# Patient Record
Sex: FEMALE | Race: BLACK OR AFRICAN AMERICAN | HISPANIC OR LATINO | Employment: UNEMPLOYED | URBAN - METROPOLITAN AREA
[De-identification: names, ages, dates, MRNs, and addresses within clinical notes are randomized per-mention and may not be internally consistent; named-entity substitution may affect disease eponyms.]

---

## 2022-07-26 ENCOUNTER — OFFICE VISIT (OUTPATIENT)
Dept: OBSTETRICS AND GYNECOLOGY | Facility: CLINIC | Age: 37
End: 2022-07-26

## 2022-07-26 VITALS
DIASTOLIC BLOOD PRESSURE: 86 MMHG | SYSTOLIC BLOOD PRESSURE: 115 MMHG | BODY MASS INDEX: 27.05 KG/M2 | HEART RATE: 89 BPM | HEIGHT: 62 IN | WEIGHT: 147 LBS

## 2022-07-26 DIAGNOSIS — E03.9 HYPOTHYROIDISM (ACQUIRED): ICD-10-CM

## 2022-07-26 DIAGNOSIS — R10.2 PELVIC PAIN: ICD-10-CM

## 2022-07-26 DIAGNOSIS — Z01.419 WELL WOMAN EXAM WITH ROUTINE GYNECOLOGICAL EXAM: Primary | ICD-10-CM

## 2022-07-26 PROCEDURE — 3008F BODY MASS INDEX DOCD: CPT | Performed by: OBSTETRICS & GYNECOLOGY

## 2022-07-26 PROCEDURE — 99385 PREV VISIT NEW AGE 18-39: CPT | Performed by: OBSTETRICS & GYNECOLOGY

## 2022-07-26 PROCEDURE — 2014F MENTAL STATUS ASSESS: CPT | Performed by: OBSTETRICS & GYNECOLOGY

## 2022-07-26 RX ORDER — NORGESTIMATE AND ETHINYL ESTRADIOL 0.25-0.035
1 KIT ORAL DAILY
Qty: 84 TABLET | Refills: 4 | Status: SHIPPED | OUTPATIENT
Start: 2022-07-26

## 2022-07-26 RX ORDER — LEVOTHYROXINE SODIUM 0.03 MG/1
100 TABLET ORAL DAILY
COMMUNITY
End: 2022-10-11 | Stop reason: SDUPTHER

## 2022-07-26 NOTE — PROGRESS NOTES
Chief Complaint   Patient presents with   • Gynecologic Exam     Np here for annual exam and to discuss birth control and thyroid        Jay Araujo is a 37 y.o.  who presents for an annual examination   Reports a pain in her pelvis.  Comes and goes. Feels most days. Worse on left  Moved here 3 months ago    Pap history:  Last pap: unsure - possibly 3 to 4 years ago  Prior abnormal paps: yes, had cone with laser in [place] at 27yo  STDs  Sexually active: yes  History of STDs: yes, chlamydia - many years ago  Has had HPV vaccine: unknown  Contraception:  OCP from [place]  Reports she has had an IUD before her delivery.      Screening for BRCA-   Is patient's family history significant for BRCA risk factors? no    Past Medical History:   Diagnosis Date   • Abnormal Pap smear of cervix    • Asthma    • Thyroid disease      Past Surgical History:   Procedure Laterality Date   • CERVICAL CONIZATION   W/ LASER      [place]   • COLPOSCOPY     • INDUCED      • SKIN BIOPSY       OB History    Para Term  AB Living   4 1 1   3 1   SAB IAB Ectopic Molar Multiple Live Births   2 1       1      # Outcome Date GA Lbr [place]/2nd Weight Sex Delivery Anes PTL Lv   4 IAB 17           3 SAB 17           2 SAB 10/26/15           1 Term 05   3685 g (8 lb 2 oz) M Vag-Spont   TIM      Social History     Tobacco Use   • Smoking status: Never Smoker   Substance Use Topics   • Alcohol use: Never   • Drug use: Never     Family History   Problem Relation Age of Onset   • Heart attack Father    • Heart disease Mother    • Breast cancer Neg Hx    • Ovarian cancer Neg Hx    • Uterine cancer Neg Hx    • Colon cancer Neg Hx      Current Outpatient Medications on File Prior to Visit   Medication Sig Dispense Refill   • levothyroxine (SYNTHROID, LEVOTHROID) 25 MCG tablet Take 25 mcg by mouth Daily.       No current facility-administered medications on file prior to visit.     Allergies   Allergen  "Reactions   • Iodinated Diagnostic Agents Swelling   • Penicillins Swelling        Review of Systems  See HPI    OBJECTIVE:   Vitals:    07/26/22 0926   BP: 115/86   Pulse: 89   Weight: 66.7 kg (147 lb)   Height: 157.5 cm (62\")      Physical Exam  Exam conducted with a chaperone present.   Constitutional:       General: She is not in acute distress.     Appearance: She is well-developed. She is not diaphoretic.   HENT:      Head: Normocephalic and atraumatic.   Neck:      Thyroid: No thyromegaly.      Trachea: No tracheal deviation.   Cardiovascular:      Rate and Rhythm: Normal rate.      Heart sounds: Normal heart sounds. No murmur heard.    No friction rub. No gallop.   Pulmonary:      Effort: Pulmonary effort is normal. No respiratory distress.      Breath sounds: Normal breath sounds.   Chest:      Chest wall: No tenderness.   Breasts:      Right: No inverted nipple, mass, nipple discharge, skin change or tenderness.      Left: No inverted nipple, mass, nipple discharge, skin change or tenderness.       Abdominal:      General: There is no distension.      Palpations: Abdomen is soft. There is no mass.      Tenderness: There is no abdominal tenderness.   Genitourinary:     General: Normal vulva.      Labia:         Right: No rash, lesion or injury.         Left: No rash, lesion or injury.       Vagina: No vaginal discharge, tenderness or bleeding.      Cervix: No cervical motion tenderness, discharge or friability.      Uterus: Not deviated, not enlarged, not fixed and not tender.       Adnexa:         Right: No mass, tenderness or fullness.          Left: No mass, tenderness or fullness.     Musculoskeletal:         General: No deformity. Normal range of motion.   Lymphadenopathy:      Cervical: No cervical adenopathy.   Skin:     General: Skin is warm and dry.      Findings: No rash.   Neurological:      Mental Status: She is alert and oriented to person, place, and time.   Psychiatric:         Behavior: " Behavior normal.         Thought Content: Thought content normal.         Judgment: Judgment normal.         ASSESSMENT/PLAN:   (Z01.419) Well woman exam with routine gynecological exam - Plan: IGP, Apt HPV,rfx 16 / 18,45, TSH, T4, Free, Chlamydia trachomatis, Neisseria gonorrhoeae, Trichomonas vaginalis, PCR - Swab, Cervix, HIV-1 / O / 2 Ag / Antibody 4th Generation, RPR, Hepatitis C Antibody, Hepatitis B Surface Antigen, US Non-ob Transvaginal    (R10.2) Pelvic pain - Plan: IGP, Apt HPV,rfx 16 / 18,45, TSH, T4, Free, Chlamydia trachomatis, Neisseria gonorrhoeae, Trichomonas vaginalis, PCR - Swab, Cervix, HIV-1 / O / 2 Ag / Antibody 4th Generation, RPR, Hepatitis C Antibody, Hepatitis B Surface Antigen, US Non-ob Transvaginal    (E03.9) Hypothyroidism (acquired) - Plan: TSH, T4, Free      Annual well woman exam:  Cervical cancer screening:    Reports h/o cervical dysplasia in past   The patient is due for a pap today.    Screening guidelines discussed with patient  Breast cancer screening:    Clinical breast exam recommended for age 20-39 years every 1-3 years   Mammogram recommended starting age 40    Breast self awareness encouraged  STD Screening   Testing desires.    Contraception :   Using oral contraceptive pill from [place]    Family history    does not demonstrate need for genetics referral   Healthy lifestyle counseling:   return for routine annual checkups   Pelvic: pain RTO for ultrasound   Hypothyroidism: unsure mcg of levothryoxine, will call with doses. Recommend TSH/free T4 today. Does not have PCP        Return GYN US, GYN follow up.

## 2022-07-27 LAB
HBV SURFACE AG SERPL QL IA: NEGATIVE
HCV AB S/CO SERPL IA: <0.1 S/CO RATIO (ref 0–0.9)
HIV 1+2 AB+HIV1 P24 AG SERPL QL IA: NON REACTIVE
RPR SER QL: NON REACTIVE
T4 FREE SERPL-MCNC: 1.54 NG/DL (ref 0.82–1.77)
TSH SERPL DL<=0.005 MIU/L-ACNC: 1.23 UIU/ML (ref 0.45–4.5)

## 2022-07-28 LAB
C TRACH RRNA SPEC QL NAA+PROBE: NEGATIVE
N GONORRHOEA RRNA SPEC QL NAA+PROBE: NEGATIVE
T VAGINALIS RRNA SPEC QL NAA+PROBE: NEGATIVE

## 2022-07-29 LAB
CYTOLOGIST CVX/VAG CYTO: NORMAL
CYTOLOGY CVX/VAG DOC CYTO: NORMAL
CYTOLOGY CVX/VAG DOC THIN PREP: NORMAL
DX ICD CODE: NORMAL
HIV 1 & 2 AB SER-IMP: NORMAL
HPV I/H RISK 4 DNA CVX QL PROBE+SIG AMP: NEGATIVE
Lab: NORMAL
OTHER STN SPEC: NORMAL
STAT OF ADQ CVX/VAG CYTO-IMP: NORMAL

## 2022-08-12 ENCOUNTER — OFFICE VISIT (OUTPATIENT)
Dept: OBSTETRICS AND GYNECOLOGY | Facility: CLINIC | Age: 37
End: 2022-08-12

## 2022-08-12 VITALS
SYSTOLIC BLOOD PRESSURE: 115 MMHG | BODY MASS INDEX: 27.53 KG/M2 | DIASTOLIC BLOOD PRESSURE: 77 MMHG | WEIGHT: 149.6 LBS | HEART RATE: 72 BPM | HEIGHT: 62 IN

## 2022-08-12 DIAGNOSIS — L98.9 SKIN LESION: ICD-10-CM

## 2022-08-12 DIAGNOSIS — R10.2 PELVIC PAIN: Primary | ICD-10-CM

## 2022-08-12 PROCEDURE — 99213 OFFICE O/P EST LOW 20 MIN: CPT | Performed by: OBSTETRICS & GYNECOLOGY

## 2022-08-12 RX ORDER — VALACYCLOVIR HYDROCHLORIDE 500 MG/1
TABLET, FILM COATED ORAL
Qty: 30 TABLET | Refills: 1 | Status: SHIPPED | OUTPATIENT
Start: 2022-08-12 | End: 2022-10-11 | Stop reason: SDUPTHER

## 2022-08-12 NOTE — PROGRESS NOTES
SUBJECTIVE:   Chief Complaint   Patient presents with   • Follow-up     Pt presents today to go over u/s results.         Jay Araujo is a 37 y.o.  who presents for ultrasound for pelvic pain which last visit she reported has been ongoing x 3 months prior to her last visit. This pain is better, but now she reports 3 years of skin lesions that come and go around the time of her period. They itch. Mostly on the labia and around the anus.  Denies any discharge/fluid from them.  No fever or chills. Has not used any topical or other treatment.     Past Medical History:   Diagnosis Date   • Abnormal Pap smear of cervix    • Asthma    • Thyroid disease      Past Surgical History:   Procedure Laterality Date   • CERVICAL CONIZATION   W/ LASER      [place]   • COLPOSCOPY     • INDUCED      • SKIN BIOPSY       OB History    Para Term  AB Living   4 1 1   3 1   SAB IAB Ectopic Molar Multiple Live Births   2 1       1      # Outcome Date GA Lbr [place]/2nd Weight Sex Delivery Anes PTL Lv   4 IAB 17           3 SAB 17           2 SAB 10/26/15           1 Term 05   3685 g (8 lb 2 oz) M Vag-Spont   TIM      Social History     Tobacco Use   • Smoking status: Never Smoker   Substance Use Topics   • Alcohol use: Never   • Drug use: Never     Family History   Problem Relation Age of Onset   • Heart attack Father    • Heart disease Mother    • Breast cancer Neg Hx    • Ovarian cancer Neg Hx    • Uterine cancer Neg Hx    • Colon cancer Neg Hx      Current Outpatient Medications on File Prior to Visit   Medication Sig Dispense Refill   • levothyroxine (SYNTHROID, LEVOTHROID) 25 MCG tablet Take 100 mcg by mouth Daily.     • norgestimate-ethinyl estradiol (Sprintec 28) 0.25-35 MG-MCG per tablet Take 1 tablet by mouth Daily. 84 tablet 4     No current facility-administered medications on file prior to visit.     Allergies   Allergen Reactions   • Iodinated Diagnostic Agents Swelling  "  • Penicillins Swelling        Review of Systems      OBJECTIVE:   Vitals:    08/12/22 0938   BP: 115/77   Pulse: 72   Weight: 67.9 kg (149 lb 9.6 oz)   Height: 157.5 cm (62.01\")      Physical Exam  Exam conducted with a chaperone present.   Constitutional:       Appearance: She is well-developed.   HENT:      Head: Normocephalic and atraumatic.   Eyes:      General: No scleral icterus.  Cardiovascular:      Rate and Rhythm: Normal rate.   Pulmonary:      Effort: Pulmonary effort is normal. No respiratory distress.   Abdominal:      General: There is no distension.      Palpations: Abdomen is soft.      Tenderness: There is no abdominal tenderness. There is no guarding.   Genitourinary:     Labia:         Right: No rash, tenderness or lesion.         Left: No rash, tenderness or lesion.       Vagina: No vaginal discharge, bleeding or lesions.      Cervix: No cervical motion tenderness or friability.      Uterus: Normal. Not enlarged and not tender.       Adnexa: Right adnexa normal.        Right: No mass or tenderness.          Left: No mass or tenderness.        Comments: Open vesicular appearing lesion at 2 o'clock near anus  Musculoskeletal:      Cervical back: Normal range of motion.   Skin:     General: Skin is warm and dry.   Neurological:      Mental Status: She is alert and oriented to person, place, and time.   Psychiatric:         Behavior: Behavior normal.         ASSESSMENT/PLAN:     ICD-10-CM ICD-9-CM   1. Pelvic pain  R10.2 [id number]   2. Skin lesion  L98.9 709.9     Ultrasound normal  Lesion with small skin opening. Swab sent  Counseled patient that we will treat with Valtrex while we await results. If negative, Will try steroid or antifungal cream    Orders Placed This Encounter   Procedures   • Herpes Simplex Virus (HSV) 1 & 2, [facility]ILMER - ThinPrep Vial, Cervix     Standing Status:   Future     Standing Expiration Date:   8/12/2023     Order Specific Question:   Release to patient     Answer:   Routine " Release       Return if symptoms worsen or fail to improve, for Annual physical.

## 2022-08-21 LAB
HSV1 DNA SPEC QL NAA+PROBE: NEGATIVE
HSV2 DNA SPEC QL NAA+PROBE: POSITIVE

## 2022-08-23 ENCOUNTER — TELEPHONE (OUTPATIENT)
Dept: OBSTETRICS AND GYNECOLOGY | Facility: CLINIC | Age: 37
End: 2022-08-23

## 2022-08-23 NOTE — TELEPHONE ENCOUNTER
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  The valtrex should help.  Herpes is an STD that is contagious. There is no cure for herpes but we do recommend treatment especially if the rash has not gone away. I will send medication to her pharmacy for treatment.  If she has questions, she may make an appointment to discuss further or I recommend looking at the CDC (Center for Disease Control) website on genital herpes.  The herpes virus is contagious even when you do not have a lesion or outbreak, so you should inform sexual partners of the diagnosis, and if they have any outbreaks they should get tested.  You can reduce transmission by not having sex (including oral sex) during an outbreak and using condoms every time you have sex. When women are pregnant, it is important that they inform their physicians that they have a history of herpes so that preventive measures can be taken around the time of delivery.      There was no answer; left general  for call back.

## 2022-09-02 ENCOUNTER — TELEPHONE (OUTPATIENT)
Dept: OBSTETRICS AND GYNECOLOGY | Facility: CLINIC | Age: 37
End: 2022-09-02

## 2022-09-02 NOTE — TELEPHONE ENCOUNTER
----- Message from Angela Shaw MD sent at 7/27/2022  8:02 AM EDT -----  Please let patient know that her thyroid testing was normal. Please confirm her synthroid dose- she was going to confirm at home.  I would recommend staying on that dose for now and establishing follow up with PCP.  Her HIV/HepC and syphilis testing was also normal (negative).

## 2022-10-06 ENCOUNTER — TELEPHONE (OUTPATIENT)
Dept: OBSTETRICS AND GYNECOLOGY | Facility: CLINIC | Age: 37
End: 2022-10-06

## 2022-10-11 ENCOUNTER — OFFICE VISIT (OUTPATIENT)
Dept: OBSTETRICS AND GYNECOLOGY | Facility: CLINIC | Age: 37
End: 2022-10-11

## 2022-10-11 VITALS
WEIGHT: 157 LBS | BODY MASS INDEX: 28.89 KG/M2 | HEART RATE: 90 BPM | HEIGHT: 62 IN | SYSTOLIC BLOOD PRESSURE: 122 MMHG | DIASTOLIC BLOOD PRESSURE: 73 MMHG

## 2022-10-11 DIAGNOSIS — B00.9 HSV INFECTION: Primary | ICD-10-CM

## 2022-10-11 DIAGNOSIS — R10.2 PELVIC PAIN: ICD-10-CM

## 2022-10-11 PROCEDURE — 99213 OFFICE O/P EST LOW 20 MIN: CPT | Performed by: OBSTETRICS & GYNECOLOGY

## 2022-10-11 RX ORDER — LEVOTHYROXINE SODIUM 0.03 MG/1
100 TABLET ORAL DAILY
Qty: 90 TABLET | Refills: 0 | Status: SHIPPED | OUTPATIENT
Start: 2022-10-11

## 2022-10-11 RX ORDER — VITAMIN C, CALCIUM, IRON, VITAMIN D3, VITAMIN E, VITAMIN B1, VITAMIN B2, VITAMIN B3, VITAMIN B6, FOLIC ACID, IODINE, ZINC, COPPER, DOCUSATE SODIUM, DOCOSAHEXAENOIC ACID (DHA) 27-1-50 MG
1 KIT ORAL DAILY
Qty: 90 EACH | Refills: 4 | Status: SHIPPED | OUTPATIENT
Start: 2022-10-11

## 2022-10-11 RX ORDER — VALACYCLOVIR HYDROCHLORIDE 500 MG/1
TABLET, FILM COATED ORAL
Qty: 30 TABLET | Refills: 1 | Status: SHIPPED | OUTPATIENT
Start: 2022-10-11

## 2022-10-11 NOTE — PROGRESS NOTES
SUBJECTIVE:   Chief Complaint   Patient presents with   • Follow-up     Pt presents today for f/u         Jay Obrienterry Araujo is a 37 y.o.  who presents for discussion of recent lesions on vulva and possible conception. She would like to come off oral contraceptive pill and try to conceive. She has the Valtrex. Reports her partner has had similar lesions in the past but not currently. She denies any issues at this time aside from some pelvic pain because she is on her period    Past Medical History:   Diagnosis Date   • Abnormal Pap smear of cervix    • Asthma    • Thyroid disease      Past Surgical History:   Procedure Laterality Date   • CERVICAL CONIZATION   W/ LASER      [place]   • COLPOSCOPY     • INDUCED      • SKIN BIOPSY       OB History    Para Term  AB Living   4 1 1   3 1   SAB IAB Ectopic Molar Multiple Live Births   2 1       1      # Outcome Date GA Lbr [place]/2nd Weight Sex Delivery Anes PTL Lv   4 IAB 17           3 SAB 17           2 SAB 10/26/15           1 Term 05   3685 g (8 lb 2 oz) M Vag-Spont   TIM      Social History     Tobacco Use   • Smoking status: Never   Substance Use Topics   • Alcohol use: Never   • Drug use: Never     Family History   Problem Relation Age of Onset   • Heart attack Father    • Heart disease Mother    • Breast cancer Neg Hx    • Ovarian cancer Neg Hx    • Uterine cancer Neg Hx    • Colon cancer Neg Hx      Current Outpatient Medications on File Prior to Visit   Medication Sig Dispense Refill   • norgestimate-ethinyl estradiol (Sprintec 28) 0.25-35 MG-MCG per tablet Take 1 tablet by mouth Daily. 84 tablet 4   • [DISCONTINUED] levothyroxine (SYNTHROID, LEVOTHROID) 25 MCG tablet Take 100 mcg by mouth Daily.     • [DISCONTINUED] valACYclovir (Valtrex) 500 MG tablet BID x 3 days PRN outbreak 30 tablet 1     No current facility-administered medications on file prior to visit.     Allergies   Allergen Reactions   •  "Iodinated Diagnostic Agents Swelling   • Penicillins Swelling        Review of Systems      OBJECTIVE:   Vitals:    10/11/22 1038   BP: 122/73   Pulse: 90   Weight: 71.2 kg (157 lb)   Height: 157.5 cm (62.01\")      Physical Exam  Constitutional:       General: She is not in acute distress.     Appearance: She is well-developed. She is not diaphoretic.   HENT:      Head: Normocephalic and atraumatic.   Eyes:      General: No scleral icterus.     Extraocular Movements: Extraocular movements intact.   Pulmonary:      Effort: Pulmonary effort is normal. No respiratory distress.   Skin:     General: Skin is warm and dry.   Neurological:      General: No focal deficit present.      Mental Status: She is alert and oriented to person, place, and time.   Psychiatric:         Mood and Affect: Mood normal.         Behavior: Behavior normal.         Thought Content: Thought content normal.         Judgment: Judgment normal.         ASSESSMENT/PLAN:     ICD-10-CM ICD-9-CM   1. HSV infection  B00.9 054.9   2. Pelvic pain  R10.2 [id number]     We reviewed that Herpes is an STD that is contagious. There is no cure for herpes but we do recommend treatment especially if the rash has not gone away. She was counseled that the herpes virus is contagious even when you do not have a lesion or outbreak, so you should inform sexual partners of the diagnosis, and if they have any outbreaks they should get tested.  We discussed that she can reduce transmission by not having sex (including oral sex) during an outbreak and using condoms every time you have sex. When women are pregnant, it is important that they inform their physicians that they have a history of herpes so that preventive measures can be taken around the time of delivery.   Patient was counseled on the expected course of fertility with each cycle.  She was counseled on avoidance of smoking, drugs, alcohol while trying to conceive.  We discussed potential causes of infertility, and " she was counseled that possible limitations for her may include calling when pregnant to adjust thyroid medication.   Discussed lab options including STI labs, vaccine titers,.    PNV recommended     No orders of the defined types were placed in this encounter.      No follow-ups on file.

## 2023-04-26 RX ORDER — LEVOTHYROXINE SODIUM 0.03 MG/1
100 TABLET ORAL DAILY
Qty: 90 TABLET | Refills: 0 | Status: SHIPPED | OUTPATIENT
Start: 2023-04-26

## 2023-04-26 NOTE — TELEPHONE ENCOUNTER
Med refill. Colin pt. AE 7/26/2022, last seen 10/11/22 pelvic pain & HSV infection. [facility] pharmacy on file. Thank you.

## 2023-05-16 RX ORDER — LEVOTHYROXINE SODIUM 0.03 MG/1
100 TABLET ORAL DAILY
Qty: 90 TABLET | Refills: 0 | OUTPATIENT
Start: 2023-05-16

## 2023-10-09 RX ORDER — NORGESTIMATE AND ETHINYL ESTRADIOL 0.25-0.035
1 KIT ORAL DAILY
Qty: 84 TABLET | Refills: 4 | OUTPATIENT
Start: 2023-10-09

## 2023-10-12 RX ORDER — NORGESTIMATE AND ETHINYL ESTRADIOL 0.25-0.035
1 KIT ORAL DAILY
Qty: 84 TABLET | Refills: 0 | Status: SHIPPED | OUTPATIENT
Start: 2023-10-12

## 2024-01-10 ENCOUNTER — OFFICE VISIT (OUTPATIENT)
Dept: FAMILY MEDICINE CLINIC | Facility: CLINIC | Age: 39
End: 2024-01-10
Payer: COMMERCIAL

## 2024-01-10 VITALS
HEIGHT: 62 IN | OXYGEN SATURATION: 96 % | WEIGHT: 165.4 LBS | HEART RATE: 106 BPM | DIASTOLIC BLOOD PRESSURE: 88 MMHG | RESPIRATION RATE: 16 BRPM | BODY MASS INDEX: 30.44 KG/M2 | TEMPERATURE: 96.9 F | SYSTOLIC BLOOD PRESSURE: 142 MMHG

## 2024-01-10 DIAGNOSIS — Z30.9 ENCOUNTER FOR CONTRACEPTIVE MANAGEMENT, UNSPECIFIED TYPE: ICD-10-CM

## 2024-01-10 DIAGNOSIS — Z71.1 CONCERN ABOUT CURRENT PREGNANCY WITHOUT DIAGNOSIS: ICD-10-CM

## 2024-01-10 DIAGNOSIS — Z83.3 FAMILY HISTORY OF DIABETES MELLITUS: ICD-10-CM

## 2024-01-10 DIAGNOSIS — E05.90 HYPERTHYROIDISM: Primary | ICD-10-CM

## 2024-01-10 DIAGNOSIS — Z13.220 LIPID SCREENING: ICD-10-CM

## 2024-01-10 LAB
B-HCG UR QL: NEGATIVE
EXPIRATION DATE: NORMAL
INTERNAL NEGATIVE CONTROL: NORMAL
INTERNAL POSITIVE CONTROL: NORMAL
Lab: NORMAL

## 2024-01-10 PROCEDURE — 81025 URINE PREGNANCY TEST: CPT | Performed by: NURSE PRACTITIONER

## 2024-01-10 PROCEDURE — 99204 OFFICE O/P NEW MOD 45 MIN: CPT | Performed by: NURSE PRACTITIONER

## 2024-01-10 RX ORDER — NORGESTIMATE AND ETHINYL ESTRADIOL 0.25-0.035
1 KIT ORAL DAILY
Qty: 28 TABLET | Refills: 12 | Status: SHIPPED | OUTPATIENT
Start: 2024-01-10

## 2024-01-10 RX ORDER — LEVOTHYROXINE SODIUM 0.1 MG/1
100 TABLET ORAL DAILY
Qty: 90 TABLET | Refills: 1 | Status: SHIPPED | OUTPATIENT
Start: 2024-01-10

## 2024-01-10 NOTE — PROGRESS NOTES
"Chief Complaint  Establish Care, Hypothyroidism, Dizziness (C/o dizziness x2 months ), Anxiety (Hx panic attacks ), Depression, Headache (Co headaches  frequent), and Asthma    THIS VISIT WAS CONDUCTED WITH THE AIDE OF THE  SERVICE TO ENSURE UNDERSTANDING BETWEEN JAY AND THIS PROVIDER.    Subjective        Jay Araujo presents to [facility]  History of Present Illness      Jay has been off her thyroid medication for 2 months and is losing hair, is fatigued, and nauseous.  She wanted to discuss birth control.  She is in her late 30's, has one child and does not desire to become pregnant at this time.  Discussed IUD with her through the  service.  She decided to stay on her current birth control at this time.    Objective   Vital Signs:  /88   Pulse 106   Temp 96.9 °F (36.1 °C) (Temporal)   Resp 16   Ht 157.5 cm (62.01\")   Wt 75 kg (165 lb 6.4 oz)   SpO2 96%   BMI 30.24 kg/m²   Estimated body mass index is 30.24 kg/m² as calculated from the following:    Height as of this encounter: 157.5 cm (62.01\").    Weight as of this encounter: 75 kg (165 lb 6.4 oz).       BMI is >= 30 and <35. (Class 1 Obesity). The following options were offered after discussion;: weight loss educational material (shared in after visit summary), exercise counseling/recommendations, and nutrition counseling/recommendations      Physical Exam  Constitutional:       Appearance: She is normal weight.   HENT:      Head: Normocephalic and atraumatic.      Right Ear: Tympanic membrane, ear canal and external ear normal.      Left Ear: Tympanic membrane, ear canal and external ear normal.      Mouth/Throat:      Mouth: Mucous membranes are moist.   Eyes:      Pupils: Pupils are equal, round, and reactive to light.   Cardiovascular:      Rate and Rhythm: Normal rate and regular rhythm.      Pulses: Normal pulses.      Heart sounds: Normal heart sounds. "   Pulmonary:      Effort: Pulmonary effort is normal.   Abdominal:      General: Abdomen is flat.   Musculoskeletal:         General: Normal range of motion.   Skin:     General: Skin is warm and dry.      Capillary Refill: Capillary refill takes less than 2 seconds.   Neurological:      General: No focal deficit present.      Mental Status: She is alert.   Psychiatric:         Mood and Affect: Mood normal.               Assessment and Plan   Diagnoses and all orders for this visit:    1. Hyperthyroidism (Primary)  -     CBC & Differential  -     Comprehensive Metabolic Panel  -     Thyroid Panel With TSH  -     Vitamin B12  -     Vitamin D,25-Hydroxy  -     Cancel: Urinalysis With Microscopic - Urine, Clean Catch  -     levothyroxine (Synthroid) 100 MCG tablet; Take 1 tablet by mouth Daily.  Dispense: 90 tablet; Refill: 1    2. Concern about current pregnancy without diagnosis  -     POC Pregnancy, Urine    3. Lipid screening  -     Lipid Panel    4. Family history of diabetes mellitus  -     Hemoglobin A1c    5. Encounter for contraceptive management, unspecified type  -     norgestimate-ethinyl estradiol (ORTHO-CYCLEN) 0.25-35 MG-MCG per tablet; Take 1 tablet by mouth Daily.  Dispense: 28 tablet; Refill: 12    Discussed Care Gaps, ordered referrals and encouraged vaccination updates.       - Pt agrees with plan of care and denies further questions/concerns today  - This document is intended for medical expert use only. Persons  reading this document without medical staff guidance may result in misinterpretation and unintended morbidity     Go to the ER for any possible life-threatening symptoms such as chest pain or shortness of air.      Please allow 3-5 business days for recommendations based on new results      I personally spent time with this patient, preparing for the visit, reviewing tests, obtaining and/or reviewing a separately obtained history, performing a medically appropriate examination and/or  evaluation, counseling and educating the patient/family/caregiver, ordering medications,  documenting information in the medical record and indepentently interpreting results.              Follow Up   Return in about 6 months (around 7/10/2024).  Patient was given instructions and counseling regarding her condition or for health maintenance advice. Please see specific information pulled into the AVS if appropriate.

## 2024-01-12 LAB
GLUCOSE UR QL STRIP: NORMAL
KETONES UR QL STRIP: NORMAL
PH UR STRIP: NORMAL [PH]
PROT UR QL STRIP: NORMAL
SP GR UR STRIP: NORMAL
SPECIMEN STATUS: NORMAL

## 2024-01-13 LAB
25(OH)D3+25(OH)D2 SERPL-MCNC: 19.8 NG/ML (ref 30–100)
ALBUMIN SERPL-MCNC: 4.3 G/DL
ALBUMIN/GLOB SERPL: 1.7 {RATIO} (ref 1.2–2.2)
ALP SERPL-CCNC: 102 IU/L (ref 44–121)
ALT SERPL-CCNC: 20 IU/L
AST SERPL-CCNC: 19 IU/L (ref 0–40)
BASOPHILS # BLD AUTO: 0 X10E3/UL
BASOPHILS NFR BLD AUTO: 1 %
BILIRUB SERPL-MCNC: <0.2 MG/DL (ref 0–1.2)
BUN SERPL-MCNC: 7 MG/DL
BUN/CREAT SERPL: 10
CALCIUM SERPL-MCNC: 9.3 MG/DL
CHLORIDE SERPL-SCNC: 105 MMOL/L (ref 96–106)
CHOLEST SERPL-MCNC: 181 MG/DL
CO2 SERPL-SCNC: 20 MMOL/L (ref 20–29)
CREAT SERPL-MCNC: 0.69 MG/DL
EGFRCR SERPLBLD CKD-EPI 2021: ABNORMAL ML/MIN/1.73
EOSINOPHIL # BLD AUTO: 0 X10E3/UL
EOSINOPHIL NFR BLD AUTO: 1 %
ERYTHROCYTE [DISTWIDTH] IN BLOOD BY AUTOMATED COUNT: 12.4 %
FT4I SERPL CALC-MCNC: 1.8 (ref 1.2–4.9)
GLOBULIN SER CALC-MCNC: 2.6 G/DL (ref 1.5–4.5)
GLUCOSE SERPL-MCNC: 108 MG/DL (ref 70–99)
HBA1C MFR BLD: 5.5 % (ref 4.8–5.6)
HCT VFR BLD AUTO: 41.4 %
HDLC SERPL-MCNC: 45 MG/DL
HGB BLD-MCNC: 13.3 G/DL
IMM GRANULOCYTES # BLD AUTO: 0 X10E3/UL
IMM GRANULOCYTES NFR BLD AUTO: 0 %
LDLC SERPL CALC-MCNC: 113 MG/DL
LYMPHOCYTES # BLD AUTO: 2.4 X10E3/UL
LYMPHOCYTES NFR BLD AUTO: 48 %
MCH RBC QN AUTO: 27.9 PG
MCHC RBC AUTO-ENTMCNC: 32.1 G/DL
MCV RBC AUTO: 87 FL
MONOCYTES # BLD AUTO: 0.4 X10E3/UL
MONOCYTES NFR BLD AUTO: 8 %
NEUTROPHILS # BLD AUTO: 2.1 X10E3/UL
NEUTROPHILS NFR BLD AUTO: 42 %
PLATELET # BLD AUTO: 271 X10E3/UL (ref 150–450)
POTASSIUM SERPL-SCNC: 4.3 MMOL/L (ref 3.5–5.2)
PROT SERPL-MCNC: 6.9 G/DL (ref 6–8.5)
RBC # BLD AUTO: 4.77 X10E6/UL
SODIUM SERPL-SCNC: 140 MMOL/L (ref 134–144)
T3RU NFR SERPL: 22 %
T4 SERPL-MCNC: 8.3 UG/DL (ref 4.5–12)
TRIGL SERPL-MCNC: 127 MG/DL
TSH SERPL DL<=0.005 MIU/L-ACNC: 1.43 UIU/ML (ref 0.45–4.5)
VIT B12 SERPL-MCNC: 225 PG/ML (ref 232–1245)
VLDLC SERPL CALC-MCNC: 23 MG/DL (ref 5–40)
WBC # BLD AUTO: 5 X10E3/UL (ref 3.4–10.8)
WRITTEN AUTHORIZATION: NORMAL

## 2024-10-11 ENCOUNTER — OFFICE VISIT (OUTPATIENT)
Dept: FAMILY MEDICINE CLINIC | Facility: CLINIC | Age: 39
End: 2024-10-11
Payer: COMMERCIAL

## 2024-10-11 VITALS
WEIGHT: 173.4 LBS | BODY MASS INDEX: 31.91 KG/M2 | HEART RATE: 88 BPM | HEIGHT: 62 IN | OXYGEN SATURATION: 100 % | SYSTOLIC BLOOD PRESSURE: 144 MMHG | DIASTOLIC BLOOD PRESSURE: 80 MMHG

## 2024-10-11 DIAGNOSIS — E03.9 ACQUIRED HYPOTHYROIDISM: Primary | ICD-10-CM

## 2024-10-11 DIAGNOSIS — E78.2 MIXED HYPERLIPIDEMIA: ICD-10-CM

## 2024-10-11 DIAGNOSIS — Z3A.01 LESS THAN 8 WEEKS GESTATION OF PREGNANCY: ICD-10-CM

## 2024-10-11 PROCEDURE — 1126F AMNT PAIN NOTED NONE PRSNT: CPT | Performed by: NURSE PRACTITIONER

## 2024-10-11 PROCEDURE — 99213 OFFICE O/P EST LOW 20 MIN: CPT | Performed by: NURSE PRACTITIONER

## 2024-10-11 NOTE — PROGRESS NOTES
Subjective   Jay Araujo is a 39 y.o. female. Presents today for No chief complaint on file.      History Of Present Illness Jay presents for an annual physical and to have her thyroid checked today    Hypothyroidism: Levothyroxine    Birth control: Ortho-Cyclen    Herpes simplex 2: Valacyclovir    Care gaps:  Annual physical    Influenza  COVID      There is no problem list on file for this patient.      Social History     Socioeconomic History    Marital status:    Tobacco Use    Smoking status: Never   Vaping Use    Vaping status: Never Used   Substance and Sexual Activity    Alcohol use: Never    Drug use: Never    Sexual activity: Yes     Partners: Male     Birth control/protection: Birth control pill       Allergies   Allergen Reactions    Iodinated Contrast Media Swelling    Penicillins Swelling       Current Outpatient Medications on File Prior to Visit   Medication Sig Dispense Refill    levothyroxine (Synthroid) 100 MCG tablet Take 1 tablet by mouth Daily. 90 tablet 1    norgestimate-ethinyl estradiol (ORTHO-CYCLEN) 0.25-35 MG-MCG per tablet Take 1 tablet by mouth Daily. 28 tablet 12    Prenat w/o A-FeCbGl-DSS-FA-DHA (PNV OB+DHA) 27-1 & 250 MG misc Take 1 tablet by mouth Daily. 90 each 4    valACYclovir (Valtrex) 500 MG tablet BID x 3 days PRN outbreak 30 tablet 1     No current facility-administered medications on file prior to visit.       Objective   There were no vitals filed for this visit.  There is no height or weight on file to calculate BMI.    Physical Exam    Procedures     Assessment & Plan   There are no diagnoses linked to this encounter.                  No follow-ups on file.

## 2024-11-12 ENCOUNTER — TELEPHONE (OUTPATIENT)
Dept: OBSTETRICS AND GYNECOLOGY | Facility: CLINIC | Age: 39
End: 2024-11-12

## 2024-11-12 ENCOUNTER — INITIAL PRENATAL (OUTPATIENT)
Dept: OBSTETRICS AND GYNECOLOGY | Facility: CLINIC | Age: 39
End: 2024-11-12
Payer: COMMERCIAL

## 2024-11-12 VITALS — BODY MASS INDEX: 32.18 KG/M2 | SYSTOLIC BLOOD PRESSURE: 110 MMHG | DIASTOLIC BLOOD PRESSURE: 78 MMHG | WEIGHT: 176 LBS

## 2024-11-12 DIAGNOSIS — O98.519 HERPES SIMPLEX TYPE 2 (HSV-2) INFECTION AFFECTING PREGNANCY, ANTEPARTUM: ICD-10-CM

## 2024-11-12 DIAGNOSIS — B00.9 HERPES SIMPLEX TYPE 2 (HSV-2) INFECTION AFFECTING PREGNANCY, ANTEPARTUM: ICD-10-CM

## 2024-11-12 DIAGNOSIS — M79.602 LEFT ARM PAIN: ICD-10-CM

## 2024-11-12 DIAGNOSIS — O34.40 PREGNANCY COMPLICATED BY PRIOR CERVICAL CONIZATION, ANTEPARTUM: ICD-10-CM

## 2024-11-12 DIAGNOSIS — Z34.90 PREGNANCY, UNSPECIFIED GESTATIONAL AGE: Primary | ICD-10-CM

## 2024-11-12 DIAGNOSIS — O09.521 MULTIGRAVIDA OF ADVANCED MATERNAL AGE IN FIRST TRIMESTER: ICD-10-CM

## 2024-11-12 DIAGNOSIS — Z86.39 HISTORY OF HYPOTHYROIDISM: ICD-10-CM

## 2024-11-12 LAB
B-HCG UR QL: POSITIVE
EXPIRATION DATE: ABNORMAL
GLUCOSE UR STRIP-MCNC: NEGATIVE MG/DL
INTERNAL NEGATIVE CONTROL: ABNORMAL
INTERNAL POSITIVE CONTROL: ABNORMAL
Lab: ABNORMAL
PROT UR STRIP-MCNC: NEGATIVE MG/DL

## 2024-11-12 RX ORDER — PNV NO.95/FERROUS FUM/FOLIC AC 28MG-0.8MG
1 TABLET ORAL DAILY
Qty: 90 TABLET | Refills: 4 | Status: SHIPPED | OUTPATIENT
Start: 2024-11-12

## 2024-11-12 NOTE — PROGRESS NOTES
Initial OB Visit    Chief Complaint   Patient presents with    Initial Prenatal Visit        Jay Araujo is being seen today for her first obstetrical visit.  She is a 39 y.o.    11w4d gestation by LMP  FOB: here with her today  This is a planned pregnancy.   OB History    Para Term  AB Living   4 1 1   2 1   SAB IAB Ectopic Molar Multiple Live Births   1 1       1      # Outcome Date GA Lbr [place]/2nd Weight Sex Type Anes PTL Lv   4 Current            3 IAB 17           2 SAB 10/26/15           1 Term 05   3685 g (8 lb 2 oz) M Vag-Spont   TIM       Current obstetric complaints: left arm and hand cramping. 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 It is worse at work. Reports some palpitations and dizziness at times, No h/o cardiac issues in herself but her mom has ?cardiomyopathy from high blood pressure, her dad is    Prior obstetric issues, potential pregnancy concerns: one  18 years ago. Reports one pregnancy loss approximately  Family history of genetic issues (includes FOB): denies  Prior infections concerning in pregnancy (Rash, fever since LMP): denies  Prior genetic testing: denies  History of abnormal pap smears: h/o conization in [place] >15 years ago  History of STIs: see below  History of HSV in self or partner? H/o HSV in .       Past Medical History:   Diagnosis Date    Abnormal Pap smear of cervix     Asthma     Thyroid disease        Past Surgical History:   Procedure Laterality Date    CERVICAL CONIZATION   W/ LASER      [place]    COLPOSCOPY      INDUCED       SKIN BIOPSY           Current Outpatient Medications:     levothyroxine (Synthroid) 100 MCG tablet, Take 1 tablet by mouth Daily. (Patient not taking: Reported on 2024), Disp: 90 tablet, Rfl: 1    norgestimate-ethinyl estradiol (ORTHO-CYCLEN) 0.25-35 MG-MCG per tablet, Take 1 tablet by mouth Daily. (Patient not taking: Reported on 2024), Disp:  28 tablet, Rfl: 12    Prenat w/o A-FeCbGl-DSS-FA-DHA (PNV OB+DHA) 27-1 & 250 MG misc, Take 1 tablet by mouth Daily. (Patient not taking: Reported on 11/12/2024), Disp: 90 each, Rfl: 4    Prenatal Vit-Fe Fumarate-FA (Prenatal Vitamin) 27-0.8 MG tablet, Take 1 tablet by mouth Daily., Disp: 90 tablet, Rfl: 4    valACYclovir (Valtrex) 500 MG tablet, BID x 3 days PRN outbreak (Patient not taking: Reported on 11/12/2024), Disp: 30 tablet, Rfl: 1    Allergies   Allergen Reactions    Iodinated Contrast Media Swelling    Penicillins Swelling       Social History     Socioeconomic History    Marital status:    Tobacco Use    Smoking status: Never   Vaping Use    Vaping status: Never Used   Substance and Sexual Activity    Alcohol use: Never    Drug use: Never    Sexual activity: Yes     Partners: Male     Birth control/protection: Birth control pill       Family History   Problem Relation Age of Onset    Heart attack Father     Heart disease Mother     Breast cancer Neg Hx     Ovarian cancer Neg Hx     Uterine cancer Neg Hx     Colon cancer Neg Hx        Review of systems   See HPI         Objective    /78   Wt 79.8 kg (176 lb)   LMP 08/23/2024 (Exact Date)   BMI 32.18 kg/m²       General Appearance:    Alert, cooperative, in no acute distress, habitus normal   Head:    Normocephalic, without obvious abnormality, atraumatic   Eyes:            Lids and lashes normal, conjunctivae and sclerae normal, no   icterus, no pallor, corneas clear   Ears:    Ears appear intact with no abnormalities noted       Neck:   No adenopathy, supple, trachea midline, no thyromegaly   Back:     No kyphosis present, no scoliosis present,                       Lungs:     Clear to auscultation,respirations regular, even and                   unlabored    Heart:    Regular rhythm and normal rate, normal S1 and S2, no            murmur, no gallop, no rub, no click   Breast Exam:    No masses, No nipple discharge   Abdomen:     Normal  bowel sounds, no masses, no organomegaly, soft        non-tender, non-distended, no guarding, no rebound                 tenderness   Genitalia:    Vulva - BUS-WNL, NEFG    Vagina - No discharge, No bleeding    Cervix - No Lesions, closed     Uterus - Consistent with 10 weeks    Adnexa - No masses, NT    Pelvimetry - clinically adequate, gynecoid pelvis     Extremities:   Moves all extremities well, no edema, no cyanosis, no              redness   Pulses:   Pulses palpable and equal bilaterally   Skin:   No bleeding, bruising or rash   Lymph nodes:   No palpable adenopathy   Neurologic:   Sensation intact, A&O times 3      Assessment  (Z34.90) Pregnancy, unspecified gestational age - Plan: POC Urinalysis Dipstick, POC Pregnancy, Urine, Urine Culture - Urine, Urine, Clean Catch, Drug Profile Urine - 9 Drugs - Urine, Clean Catch, Chlamydia trachomatis, Neisseria gonorrhoeae, Trichomonas vaginalis, PCR - Swab, Vagina, Varicella Zoster Antibody, IgG, OB Panel With HIV and Treponema Palldium Ab, TSH Rfx On Abnormal To Free T4, [id number] PLUS Core+SCA - Blood,, Inheritest Core Panel (CF97,SMA,FraX) - Blood,, Comprehensive Metabolic Panel, Hemoglobin A1c, Holter Monitor - 24 Hour, Ambulatory Referral to Physical Therapy for Evaluation & Treatment    (O98.519,  B00.9) Herpes simplex type 2 (HSV-2) infection affecting pregnancy, antepartum    (O34.40) Pregnancy complicated by prior cervical conization, antepartum    (M79.602) Left arm pain - Plan: Comprehensive Metabolic Panel, Hemoglobin A1c, Holter Monitor - 24 Hour, Ambulatory Referral to Physical Therapy for Evaluation & Treatment    (Z86.39) History of hypothyroidism - Plan: TSH Rfx On Abnormal To Free T4, Hemoglobin A1c    (O09.521) Multigravida of advanced maternal age in first trimester - Plan: [id number] PLUS Core+SCA - Blood,, Inheritest Core Panel (CF97,SMA,FraX) - Blood,, Hemoglobin A1c    Plan    Initial labs drawn, GC/CHL screen done  Patient is on Prenatal  vitamins  Problem list reviewed and updated.  Reviewed routine prenatal care with the office to include but not limited to:   Questions regarding specific pregnancy activities.  Reviewed nature of practice and hospital.  Reviewed recommended follow up, importance of compliance with care. We reviewed testing in pregnancy including HIV testing and urine drug screen.    Reviewed aneuploidy screening and carrier genetic screening.  We reviewed limitations of testing, possibility of false positive/negative results, possible need for other tests as indicated.    Desires cell free DNA and carrier screening  Counseled on limitations of ultrasound in pregnancy in detecting aneuploidy/fetal anomalies  - will check holter monitor and CMP but it seems MSK related. Sent referral to PT due to limitations in job  - check TSH for h/o hypothyroidism, previously on 100mcg Synthroid  - HSV, reviewed suppressoin  - H/o cone- cervical length    All questions answered.   Return for US today (11:20), 4 weeks for OB visit. Labs after US today.      Angela Shaw MD

## 2024-11-12 NOTE — TELEPHONE ENCOUNTER
Please let patient know that her due date is 6/10/25 with a gestational age of 10w based on today's Ultrasound. Thanks!

## 2024-11-13 LAB
ALBUMIN SERPL-MCNC: 4.3 G/DL (ref 3.9–4.9)
ALP SERPL-CCNC: 124 IU/L (ref 44–121)
ALT SERPL-CCNC: 18 IU/L (ref 0–32)
AMPHETAMINES UR QL SCN: NEGATIVE NG/ML
AST SERPL-CCNC: 18 IU/L (ref 0–40)
BARBITURATES UR QL SCN: NEGATIVE NG/ML
BENZODIAZ UR QL: NEGATIVE NG/ML
BILIRUB SERPL-MCNC: 0.3 MG/DL (ref 0–1.2)
BUN SERPL-MCNC: 6 MG/DL (ref 6–20)
BUN/CREAT SERPL: 8 (ref 9–23)
BZE UR QL: NEGATIVE NG/ML
CALCIUM SERPL-MCNC: 9.8 MG/DL (ref 8.7–10.2)
CANNABINOIDS UR QL SCN: NEGATIVE NG/ML
CHLORIDE SERPL-SCNC: 103 MMOL/L (ref 96–106)
CO2 SERPL-SCNC: 22 MMOL/L (ref 20–29)
CREAT SERPL-MCNC: 0.73 MG/DL (ref 0.57–1)
EGFRCR SERPLBLD CKD-EPI 2021: 108 ML/MIN/1.73
GLOBULIN SER CALC-MCNC: 2.8 G/DL (ref 1.5–4.5)
GLUCOSE SERPL-MCNC: 76 MG/DL (ref 70–99)
HBA1C MFR BLD: 5.5 % (ref 4.8–5.6)
METHADONE UR QL SCN: NEGATIVE NG/ML
OPIATES UR QL: NEGATIVE NG/ML
PCP UR QL SCN: NEGATIVE NG/ML
POTASSIUM SERPL-SCNC: 4.8 MMOL/L (ref 3.5–5.2)
PROPOXYPH UR QL SCN: NEGATIVE NG/ML
PROT SERPL-MCNC: 7.1 G/DL (ref 6–8.5)
SODIUM SERPL-SCNC: 138 MMOL/L (ref 134–144)
TSH SERPL DL<=0.005 MIU/L-ACNC: 1.42 UIU/ML (ref 0.45–4.5)
VZV IGG SER QL IA: NON REACTIVE

## 2024-11-14 LAB
ABO GROUP BLD: NORMAL
BASOPHILS # BLD AUTO: 0 X10E3/UL (ref 0–0.2)
BASOPHILS NFR BLD AUTO: 1 %
BLD GP AB SCN SERPL QL: NEGATIVE
C TRACH RRNA SPEC QL NAA+PROBE: NEGATIVE
EOSINOPHIL # BLD AUTO: 0.1 X10E3/UL (ref 0–0.4)
EOSINOPHIL NFR BLD AUTO: 1 %
ERYTHROCYTE [DISTWIDTH] IN BLOOD BY AUTOMATED COUNT: 12.2 % (ref 11.7–15.4)
HBV SURFACE AG SERPL QL IA: NEGATIVE
HCT VFR BLD AUTO: 40.9 % (ref 34–46.6)
HCV AB SERPL QL IA: NORMAL
HCV IGG SERPL QL IA: NON REACTIVE
HGB BLD-MCNC: 13.5 G/DL (ref 11.1–15.9)
HIV 1+2 AB+HIV1 P24 AG SERPL QL IA: NON REACTIVE
IMM GRANULOCYTES # BLD AUTO: 0 X10E3/UL (ref 0–0.1)
IMM GRANULOCYTES NFR BLD AUTO: 1 %
LYMPHOCYTES # BLD AUTO: 1.9 X10E3/UL (ref 0.7–3.1)
LYMPHOCYTES NFR BLD AUTO: 38 %
MCH RBC QN AUTO: 29 PG (ref 26.6–33)
MCHC RBC AUTO-ENTMCNC: 33 G/DL (ref 31.5–35.7)
MCV RBC AUTO: 88 FL (ref 79–97)
MONOCYTES # BLD AUTO: 0.4 X10E3/UL (ref 0.1–0.9)
MONOCYTES NFR BLD AUTO: 8 %
N GONORRHOEA RRNA SPEC QL NAA+PROBE: NEGATIVE
NEUTROPHILS # BLD AUTO: 2.5 X10E3/UL (ref 1.4–7)
NEUTROPHILS NFR BLD AUTO: 51 %
PLATELET # BLD AUTO: 264 X10E3/UL (ref 150–450)
RBC # BLD AUTO: 4.66 X10E6/UL (ref 3.77–5.28)
RH BLD: POSITIVE
RUBV IGG SERPL IA-ACNC: 2.13 INDEX
T VAGINALIS RRNA SPEC QL NAA+PROBE: NEGATIVE
TREPONEMA PALLIDUM IGG+IGM AB [PRESENCE] IN SERUM OR PLASMA BY IMMUNOASSAY: NON REACTIVE
WBC # BLD AUTO: 4.9 X10E3/UL (ref 3.4–10.8)

## 2024-11-15 LAB
BACTERIA UR CULT: NORMAL
BACTERIA UR CULT: NORMAL

## 2024-11-17 LAB
CFDNA.FET/CFDNA.TOTAL SFR FETUS: NORMAL %
CITATION REF LAB TEST: NORMAL
FET 13+18+21+X+Y ANEUP PLAS.CFDNA: NEGATIVE
FET CHR 21 TS PLAS.CFDNA QL: NEGATIVE
FET MS X RISK WBC.DNA+CFDNA QL: NOT DETECTED
FET SEX PLAS.CFDNA DOSAGE CFDNA: NORMAL
FET TS 13 RISK PLAS.CFDNA QL: NEGATIVE
FET TS 18 RISK WBC.DNA+CFDNA QL: NEGATIVE
FET X + Y ANEUP RISK PLAS.CFDNA SEQ-IMP: NOT DETECTED
GA EST FROM CONCEPTION DATE: NORMAL D
GESTATIONAL AGE > 9:: YES
LAB DIRECTOR NAME PROVIDER: NORMAL
LAB DIRECTOR NAME PROVIDER: NORMAL
LABORATORY COMMENT REPORT: NORMAL
LIMITATIONS OF THE TEST: NORMAL
NEGATIVE PREDICTIVE VALUE: NORMAL
NOTE: NORMAL
PERFORMANCE CHARACTERISTICS: NORMAL
POSITIVE PREDICTIVE VALUE: NORMAL
REF LAB TEST METHOD: NORMAL
TEST PERFORMANCE INFO SPEC: NORMAL

## 2024-11-20 LAB
CITATION REF LAB TEST: NORMAL
GENDER IDENTITY: NORMAL
GENE DIS ANL CARRIER INTERP-IMP: NORMAL
GENE STUDIED ID: NORMAL
GENETIC SCN SPEC: NORMAL
LAB DIRECTOR NAME PROVIDER: NORMAL
Lab: NORMAL
REASON FOR REFERRAL (NARRATIVE): NORMAL
RECOMMENDATION PATIENT DOC-IMP: NORMAL
REF LAB TEST METHOD: NORMAL
SERVICE CMNT-IMP: NORMAL
SPECIMEN SOURCE: NORMAL

## 2024-12-10 ENCOUNTER — ROUTINE PRENATAL (OUTPATIENT)
Dept: OBSTETRICS AND GYNECOLOGY | Facility: CLINIC | Age: 39
End: 2024-12-10
Payer: COMMERCIAL

## 2024-12-10 VITALS — BODY MASS INDEX: 31.12 KG/M2 | DIASTOLIC BLOOD PRESSURE: 76 MMHG | WEIGHT: 170.2 LBS | SYSTOLIC BLOOD PRESSURE: 108 MMHG

## 2024-12-10 DIAGNOSIS — R51.9 NONINTRACTABLE EPISODIC HEADACHE, UNSPECIFIED HEADACHE TYPE: ICD-10-CM

## 2024-12-10 DIAGNOSIS — B00.9 HERPES SIMPLEX TYPE 2 (HSV-2) INFECTION AFFECTING PREGNANCY, ANTEPARTUM: ICD-10-CM

## 2024-12-10 DIAGNOSIS — Z3A.14 14 WEEKS GESTATION OF PREGNANCY: Primary | ICD-10-CM

## 2024-12-10 DIAGNOSIS — O98.519 HERPES SIMPLEX TYPE 2 (HSV-2) INFECTION AFFECTING PREGNANCY, ANTEPARTUM: ICD-10-CM

## 2024-12-10 DIAGNOSIS — K59.00 CONSTIPATION, UNSPECIFIED CONSTIPATION TYPE: ICD-10-CM

## 2024-12-10 DIAGNOSIS — O09.521 MULTIGRAVIDA OF ADVANCED MATERNAL AGE IN FIRST TRIMESTER: ICD-10-CM

## 2024-12-10 LAB
GLUCOSE UR STRIP-MCNC: NEGATIVE MG/DL
PROT UR STRIP-MCNC: NEGATIVE MG/DL

## 2024-12-10 RX ORDER — VALACYCLOVIR HYDROCHLORIDE 500 MG/1
TABLET, FILM COATED ORAL
Qty: 30 TABLET | Refills: 1 | Status: SHIPPED | OUTPATIENT
Start: 2024-12-10

## 2024-12-10 RX ORDER — VITAMIN C, CALCIUM, IRON, VITAMIN D3, VITAMIN E, THIAMIN, RIBOFLAVIN, NIACINAMIDE, VITAMIN B6, FOLIC ACID, IODINE, ZINC, COPPER, DOCUSATE SODIUM
1 KIT DAILY
Qty: 60 TABLET | Refills: 6 | Status: SHIPPED | OUTPATIENT
Start: 2024-12-10

## 2024-12-10 RX ORDER — DOCUSATE SODIUM 100 MG/1
100 CAPSULE, LIQUID FILLED ORAL 2 TIMES DAILY
Qty: 60 CAPSULE | Refills: 1 | Status: SHIPPED | OUTPATIENT
Start: 2024-12-10

## 2024-12-10 NOTE — PROGRESS NOTES
Chief Complaint   Patient presents with    Routine Prenatal Visit     14 weeks  Pt states unable to  prenatal at the pharmacy       Jay Araujo is a 39 y.o.  at 14w0d  here for routine OB visit  Reports she has had a HA 1-2 days per week. Better with rest. Usually in AM  Reports constipation, not taking anything for this  Has not yet been contacted by PT for likely carpal tunnel  Reports decreased appetite, beth for meat.  Eating beans for protein    /76   Wt 77.2 kg (170 lb 3.2 oz)   LMP 2024 (Exact Date)   BMI 31.12 kg/m²        Gen: NAD, well appearing  Abd: nontender    See OB Flowsheet    ASSESSMENT/PLAN:  Diagnoses and all orders for this visit:    1. 14 weeks gestation of pregnancy (Primary)  -     POC Urinalysis Dipstick    2. Herpes simplex type 2 (HSV-2) infection affecting pregnancy, antepartum    3. Multigravida of advanced maternal age in first trimester    4. Nonintractable episodic headache, unspecified headache type    5. Constipation, unspecified constipation type    Other orders  -     Prenat w/o A-FeCbGl-DSS-FA-DHA (CitraNatal DHA) 27-1 & 250 MG tablet; Take 1 tablet by mouth Daily.  Dispense: 60 tablet; Refill: 6  -     valACYclovir (Valtrex) 500 MG tablet; BID x 3 days PRN outbreak  Dispense: 30 tablet; Refill: 1  -     docusate sodium (Colace) 100 MG capsule; Take 1 capsule by mouth 2 (Two) Times a Day.  Dispense: 60 capsule; Refill: 1    Reviewed HA. Recommend trial of tylenol and if not improved, call us  Reviewed constipation, Rx sent for colace  Refill sent on PNV  Reviewed valtrex sent in case of HSV outbreak  Aware of labs  Reviewed balanced diet, likely will improve with second trimester  Reviewed common second trimester symptoms.  Reviewed precautions for signs of  labor, anticipated fetal movements.       Return in about 5 weeks (around 2025) for OB visit with anatomy US.

## 2025-01-12 NOTE — PROGRESS NOTES
THIS VISIT WAS CONDUCTED WITH THE AIDE OF THE  SERVICE TO ENSURE UNDERSTANDING BETWEEN JAY AND THIS PROVIDER.     Chana did not record visit.  Likely  error.      Subjective   Jay Araujo is a 39 y.o. female. Presents today for   Chief Complaint   Patient presents with    Hypothyroidism       History Of Present Illness Jay Araujo is a 39 year old female presenting today for 3 month follow up on pregnancy.  She reports her blood pressure has been running 199/126 in the early afternoons.  She wore a holter monitor for 72 hours and the results were unremarkable.    History of Present Illness      Patient Active Problem List   Diagnosis    Herpes simplex type 2 (HSV-2) infection affecting pregnancy, antepartum    Pregnancy complicated by prior cervical conization, antepartum       Social History     Socioeconomic History    Marital status:    Tobacco Use    Smoking status: Never   Vaping Use    Vaping status: Never Used   Substance and Sexual Activity    Alcohol use: Never    Drug use: Never    Sexual activity: Yes     Partners: Male     Birth control/protection: Birth control pill       Allergies   Allergen Reactions    Iodinated Contrast Media Swelling    Penicillins Swelling       Current Outpatient Medications on File Prior to Visit   Medication Sig Dispense Refill    cephalexin (KEFLEX) 500 MG capsule Take 1 capsule by mouth Every 12 (Twelve) Hours.      docusate sodium (Colace) 100 MG capsule Take 1 capsule by mouth 2 (Two) Times a Day. 60 capsule 1    polyethylene glycol (MIRALAX) 17 GM/SCOOP powder DISSOLVE 17 GRAMS IN WATER OR JUICE AND TAKE BY MOUTH EVERY DAY. START WITH 1 CAPFUL DAILY AND GO UP OR DOWN UNTIL HAVING 1 SOFT BOWEL MOVEMENT (Patient not taking: Reported on 1/13/2025)      [DISCONTINUED] Prenat w/o A-FeCbGl-DSS-FA-DHA (CitraNatal DHA) 27-1 & 250 MG tablet Take 1 tablet by mouth Daily. (Patient not taking: Reported on 1/13/2025) 60  "tablet 6    [DISCONTINUED] Prenatal Vit-Fe Fumarate-FA (Prenatal Vitamin) 27-0.8 MG tablet Take 1 tablet by mouth Daily. (Patient not taking: Reported on 1/13/2025) 90 tablet 4    [DISCONTINUED] valACYclovir (Valtrex) 500 MG tablet BID x 3 days PRN outbreak (Patient not taking: Reported on 1/13/2025) 30 tablet 1     No current facility-administered medications on file prior to visit.       Objective   Vitals:    01/13/25 1113   BP: 120/76   Pulse: 90   SpO2: 97%   Weight: 78.7 kg (173 lb 6.4 oz)   Height: 157.5 cm (62.01\")     Body mass index is 31.71 kg/m².    Physical Exam    Physical Exam  Constitutional:       Appearance: Normal appearance.   HENT:      Head: Normocephalic and atraumatic.      Mouth/Throat:      Mouth: Mucous membranes are moist.   Eyes:      Pupils: Pupils are equal, round, and reactive to light.   Cardiovascular:      Rate and Rhythm: Normal rate and regular rhythm.      Pulses: Normal pulses.      Heart sounds: Normal heart sounds.   Pulmonary:      Effort: Pulmonary effort is normal.      Breath sounds: Normal breath sounds.   Abdominal:      General: Bowel sounds are normal.      Comments: SHE HAS A MODERATE PREGNANCY BELLY   Musculoskeletal:         General: Normal range of motion.   Skin:     General: Skin is warm and dry.      Capillary Refill: Capillary refill takes less than 2 seconds.   Neurological:      General: No focal deficit present.      Mental Status: She is alert.   Psychiatric:         Mood and Affect: Mood normal.       Results         Procedures     Assessment & Plan   Diagnoses and all orders for this visit:    1. Pregnancy, unspecified gestational age (Primary)  -     Prenat w/o A-FeCbGl-DSS-FA-DHA (CitraNatal DHA) 27-1 & 250 MG tablet; Take 1 tablet by mouth Daily.  Dispense: 60 tablet; Refill: 6  -     pyridoxine (VITAMIN B-6) 250 MG tablet tablet; Take 1 tablet by mouth Daily.  Dispense: 30 tablet; Refill: 6  -     CBC & Differential  -     Comprehensive Metabolic " Panel  -     Hemoglobin A1c    2. Primary hypertension  -     NIFEdipine (PROCARDIA) 20 MG capsule; Take 1 capsule by mouth Daily. Take at noon to prevent spikes in blood pressure  Dispense: 90 capsule; Refill: 3    3. Lipid screening  -     Lipid Panel    Other orders  -     nystatin (MYCOSTATIN) 394664 UNIT/GM powder; Apply  topically to the appropriate area as directed 2 (Two) Times a Day.  Dispense: 30 g; Refill: 3         Assessment & Plan    Body mass index is 31.71 kg/m².    Discussed Care Gaps, ordered referrals and encouraged vaccination updates.       - Pt agrees with plan of care and denies further questions/concerns today  - This document is intended for medical expert use only. Persons  reading this document without medical staff guidance may result in misinterpretation and unintended morbidity     Go to the ER for any possible life-threatening symptoms such as chest pain or shortness of air.      Please allow 3-5 business days for recommendations based on new results      I personally spent time with this patient, preparing for the visit, reviewing tests, obtaining and/or reviewing a separately obtained history, performing a medically appropriate examination and/or evaluation, counseling and educating the patient/family/caregiver, ordering medications,  documenting information in the medical record and indepentently interpreting results.                    Return in about 2 weeks (around 1/27/2025) for Next scheduled follow up BLOOD PRESSSURE FOLLOW UP.            MDM:  Placed patient on nifedipine daily for hypertension in the afternoons with instructions to take the medication around noon, as her spikes are around 2:00 pm.  She is to follow up with me in 2 weeks to see if the medication is effective.

## 2025-01-13 ENCOUNTER — OFFICE VISIT (OUTPATIENT)
Dept: FAMILY MEDICINE CLINIC | Facility: CLINIC | Age: 40
End: 2025-01-13
Payer: COMMERCIAL

## 2025-01-13 VITALS
HEIGHT: 62 IN | HEART RATE: 90 BPM | DIASTOLIC BLOOD PRESSURE: 76 MMHG | WEIGHT: 173.4 LBS | OXYGEN SATURATION: 97 % | BODY MASS INDEX: 31.91 KG/M2 | SYSTOLIC BLOOD PRESSURE: 120 MMHG

## 2025-01-13 DIAGNOSIS — Z34.90 PREGNANCY, UNSPECIFIED GESTATIONAL AGE: Primary | ICD-10-CM

## 2025-01-13 DIAGNOSIS — Z13.220 LIPID SCREENING: ICD-10-CM

## 2025-01-13 DIAGNOSIS — I10 PRIMARY HYPERTENSION: ICD-10-CM

## 2025-01-13 PROCEDURE — 1126F AMNT PAIN NOTED NONE PRSNT: CPT | Performed by: NURSE PRACTITIONER

## 2025-01-13 PROCEDURE — 99214 OFFICE O/P EST MOD 30 MIN: CPT | Performed by: NURSE PRACTITIONER

## 2025-01-13 RX ORDER — NYSTATIN 100000 [USP'U]/G
POWDER TOPICAL 2 TIMES DAILY
Qty: 30 G | Refills: 3 | Status: SHIPPED | OUTPATIENT
Start: 2025-01-13

## 2025-01-13 RX ORDER — VITAMIN C, CALCIUM, IRON, VITAMIN D3, VITAMIN E, THIAMIN, RIBOFLAVIN, NIACINAMIDE, VITAMIN B6, FOLIC ACID, IODINE, ZINC, COPPER, DOCUSATE SODIUM
1 KIT DAILY
Qty: 60 TABLET | Refills: 6 | Status: SHIPPED | OUTPATIENT
Start: 2025-01-13

## 2025-01-13 RX ORDER — POLYETHYLENE GLYCOL 3350 17 G/17G
POWDER, FOR SOLUTION ORAL
COMMUNITY
Start: 2024-12-16

## 2025-01-13 RX ORDER — NIFEDIPINE 20 MG/1
20 CAPSULE ORAL DAILY
Qty: 90 CAPSULE | Refills: 3 | Status: SHIPPED | OUTPATIENT
Start: 2025-01-13

## 2025-01-13 RX ORDER — CEPHALEXIN 500 MG/1
1 CAPSULE ORAL EVERY 12 HOURS SCHEDULED
COMMUNITY
Start: 2024-12-16

## 2025-01-13 RX ORDER — PYRIDOXINE HCL (VITAMIN B6) 250 MG
250 TABLET ORAL DAILY
Qty: 30 TABLET | Refills: 6 | Status: SHIPPED | OUTPATIENT
Start: 2025-01-13

## 2025-01-14 ENCOUNTER — ROUTINE PRENATAL (OUTPATIENT)
Dept: OBSTETRICS AND GYNECOLOGY | Facility: CLINIC | Age: 40
End: 2025-01-14
Payer: COMMERCIAL

## 2025-01-14 VITALS — DIASTOLIC BLOOD PRESSURE: 67 MMHG | SYSTOLIC BLOOD PRESSURE: 107 MMHG | WEIGHT: 171 LBS | BODY MASS INDEX: 31.27 KG/M2

## 2025-01-14 DIAGNOSIS — Z3A.19 19 WEEKS GESTATION OF PREGNANCY: Primary | ICD-10-CM

## 2025-01-14 DIAGNOSIS — O09.522 MULTIGRAVIDA OF ADVANCED MATERNAL AGE IN SECOND TRIMESTER: ICD-10-CM

## 2025-01-14 DIAGNOSIS — O98.519 HERPES SIMPLEX TYPE 2 (HSV-2) INFECTION AFFECTING PREGNANCY, ANTEPARTUM: ICD-10-CM

## 2025-01-14 DIAGNOSIS — B00.9 HERPES SIMPLEX TYPE 2 (HSV-2) INFECTION AFFECTING PREGNANCY, ANTEPARTUM: ICD-10-CM

## 2025-01-14 DIAGNOSIS — O16.9 ELEVATED BLOOD PRESSURE AFFECTING PREGNANCY, ANTEPARTUM: ICD-10-CM

## 2025-01-14 LAB
ALBUMIN SERPL-MCNC: 3.6 G/DL (ref 3.9–4.9)
ALP SERPL-CCNC: 140 IU/L (ref 44–121)
ALT SERPL-CCNC: 12 IU/L (ref 0–32)
AST SERPL-CCNC: 11 IU/L (ref 0–40)
BASOPHILS # BLD AUTO: 0 X10E3/UL (ref 0–0.2)
BASOPHILS NFR BLD AUTO: 1 %
BILIRUB SERPL-MCNC: <0.2 MG/DL (ref 0–1.2)
BUN SERPL-MCNC: 4 MG/DL (ref 6–20)
BUN/CREAT SERPL: 8 (ref 9–23)
CALCIUM SERPL-MCNC: 9.1 MG/DL (ref 8.7–10.2)
CHLORIDE SERPL-SCNC: 106 MMOL/L (ref 96–106)
CHOLEST SERPL-MCNC: 211 MG/DL (ref 100–199)
CO2 SERPL-SCNC: 19 MMOL/L (ref 20–29)
CREAT SERPL-MCNC: 0.52 MG/DL (ref 0.57–1)
EGFRCR SERPLBLD CKD-EPI 2021: 122 ML/MIN/1.73
EOSINOPHIL # BLD AUTO: 0.1 X10E3/UL (ref 0–0.4)
EOSINOPHIL NFR BLD AUTO: 1 %
ERYTHROCYTE [DISTWIDTH] IN BLOOD BY AUTOMATED COUNT: 12.3 % (ref 11.7–15.4)
GLOBULIN SER CALC-MCNC: 2.2 G/DL (ref 1.5–4.5)
GLUCOSE SERPL-MCNC: 74 MG/DL (ref 70–99)
GLUCOSE UR STRIP-MCNC: NEGATIVE MG/DL
HBA1C MFR BLD: 5.3 % (ref 4.8–5.6)
HCT VFR BLD AUTO: 35.8 % (ref 34–46.6)
HDLC SERPL-MCNC: 51 MG/DL
HGB BLD-MCNC: 11.8 G/DL (ref 11.1–15.9)
IMM GRANULOCYTES # BLD AUTO: 0 X10E3/UL (ref 0–0.1)
IMM GRANULOCYTES NFR BLD AUTO: 1 %
LDLC SERPL CALC-MCNC: 136 MG/DL (ref 0–99)
LYMPHOCYTES # BLD AUTO: 1.6 X10E3/UL (ref 0.7–3.1)
LYMPHOCYTES NFR BLD AUTO: 31 %
MCH RBC QN AUTO: 29 PG (ref 26.6–33)
MCHC RBC AUTO-ENTMCNC: 33 G/DL (ref 31.5–35.7)
MCV RBC AUTO: 88 FL (ref 79–97)
MONOCYTES # BLD AUTO: 0.5 X10E3/UL (ref 0.1–0.9)
MONOCYTES NFR BLD AUTO: 9 %
NEUTROPHILS # BLD AUTO: 3.1 X10E3/UL (ref 1.4–7)
NEUTROPHILS NFR BLD AUTO: 57 %
PLATELET # BLD AUTO: 201 X10E3/UL (ref 150–450)
POTASSIUM SERPL-SCNC: 3.9 MMOL/L (ref 3.5–5.2)
PROT SERPL-MCNC: 5.8 G/DL (ref 6–8.5)
PROT UR STRIP-MCNC: NEGATIVE MG/DL
RBC # BLD AUTO: 4.07 X10E6/UL (ref 3.77–5.28)
SODIUM SERPL-SCNC: 138 MMOL/L (ref 134–144)
TRIGL SERPL-MCNC: 133 MG/DL (ref 0–149)
VLDLC SERPL CALC-MCNC: 24 MG/DL (ref 5–40)
WBC # BLD AUTO: 5.3 X10E3/UL (ref 3.4–10.8)

## 2025-01-14 NOTE — PROGRESS NOTES
Your BUN and Creatinine are low showing that your muscle mass is low.  Please add some strength training to your daily routine.  Your Protein and albumin levels are low likely due to your pregnancy.  We will monitor them for now.  Your alkaline phosphatase level is elevated.  This could also be from your pregnancy.  Your Total cholesterol is elevated at 211 and your LDL is elevated at 136.  For now, watch your diet and exercise as this will assist in normalizing these levels.

## 2025-01-14 NOTE — PROGRESS NOTES
Chief Complaint   Patient presents with    Routine Prenatal Visit     19 weeks      Jay Araujo is a 39 y.o.  at 19w0d  here for routine OB visit  Reports that she has not been able to  RX from pharmacy.  Reports she went to ED ([facility]) and was sent an antibiotic but did not pick it up due to her penicillin allergy. Reports swelling and hives from penicillin as a child. Reports getting a cephalosporin last pregnancy and doing the same thing when she took it. No pain with urination  Went to see Family Medicine yesterday and was given Procardia immediate release for afternoon episodes of hypertension. She has had normal blood pressures during her office visits. She has not started taking this.    She was also given B6 and sent a refill of PNV    /67   Wt 77.6 kg (171 lb)   LMP 2024 (Exact Date)   BMI 31.27 kg/m²        Gen: NAD, well appearing  Abd: nontender    See OB Flowsheet    ASSESSMENT/PLAN:  Diagnoses and all orders for this visit:    1. 19 weeks gestation of pregnancy (Primary)  -     POC Urinalysis Dipstick    2. Multigravida of advanced maternal age in second trimester    3. Herpes simplex type 2 (HSV-2) infection affecting pregnancy, antepartum    Reviewed anatomy US and limitations. We will repeat next visit for suboptimal face/cardiac views  She was able to  valtrex but reports no more outbreaks  She will go to pharmacy and  PNV.    We reviewed her afternoon BP elevations.  I reviewed that nifedipine is safe in pregnancy but if she is not having true HTN it could cause low BP and dizziness. I asked her to make an appt for BP check in afternoon or to call if she feels it is going up.  She will come back for this.    Reviewed antibiotic allergies.  Her UA at [facility] showed protein, culture was negative.  No need to take antibiotics  Reviewed common second trimester symptoms.  Reviewed precautions for signs of  labor, anticipated fetal  movements.     Considering tubal ligation. Counseled and will consider if desires to sign papers  Return in about 4 weeks (around 2/11/2025) for anatomy US (repeat) and OB visit; please make nurse appointment in the afternoon for a BP check.

## 2025-01-16 ENCOUNTER — CLINICAL SUPPORT (OUTPATIENT)
Dept: OBSTETRICS AND GYNECOLOGY | Facility: CLINIC | Age: 40
End: 2025-01-16
Payer: COMMERCIAL

## 2025-01-16 VITALS — SYSTOLIC BLOOD PRESSURE: 112 MMHG | DIASTOLIC BLOOD PRESSURE: 74 MMHG

## 2025-01-28 DIAGNOSIS — R11.0 NAUSEA: Primary | ICD-10-CM

## 2025-01-28 RX ORDER — LANOLIN ALCOHOL/MO/W.PET/CERES
50 CREAM (GRAM) TOPICAL DAILY
Qty: 180 TABLET | Refills: 1 | Status: SHIPPED | OUTPATIENT
Start: 2025-01-28

## 2025-02-11 ENCOUNTER — ROUTINE PRENATAL (OUTPATIENT)
Dept: OBSTETRICS AND GYNECOLOGY | Facility: CLINIC | Age: 40
End: 2025-02-11
Payer: COMMERCIAL

## 2025-02-11 VITALS — DIASTOLIC BLOOD PRESSURE: 79 MMHG | BODY MASS INDEX: 31.89 KG/M2 | SYSTOLIC BLOOD PRESSURE: 117 MMHG | WEIGHT: 174.4 LBS

## 2025-02-11 DIAGNOSIS — Z13.89 SCREENING FOR BLOOD OR PROTEIN IN URINE: Primary | ICD-10-CM

## 2025-02-11 DIAGNOSIS — Z34.90 PREGNANCY, UNSPECIFIED GESTATIONAL AGE: ICD-10-CM

## 2025-02-11 DIAGNOSIS — R11.0 NAUSEA: ICD-10-CM

## 2025-02-11 DIAGNOSIS — B00.9 HERPES SIMPLEX TYPE 2 (HSV-2) INFECTION AFFECTING PREGNANCY, ANTEPARTUM: ICD-10-CM

## 2025-02-11 DIAGNOSIS — M54.32 LEFT SCIATIC NERVE PAIN: ICD-10-CM

## 2025-02-11 DIAGNOSIS — O98.519 HERPES SIMPLEX TYPE 2 (HSV-2) INFECTION AFFECTING PREGNANCY, ANTEPARTUM: ICD-10-CM

## 2025-02-11 DIAGNOSIS — Z86.39 HISTORY OF HYPOTHYROIDISM: ICD-10-CM

## 2025-02-11 LAB
GLUCOSE UR STRIP-MCNC: NEGATIVE MG/DL
PROT UR STRIP-MCNC: ABNORMAL MG/DL

## 2025-02-11 RX ORDER — VITAMIN C, CALCIUM, IRON, VITAMIN D3, VITAMIN E, THIAMIN, RIBOFLAVIN, NIACINAMIDE, VITAMIN B6, FOLIC ACID, IODINE, ZINC, COPPER, DOCUSATE SODIUM
1 KIT DAILY
Qty: 60 TABLET | Refills: 6 | Status: SHIPPED | OUTPATIENT
Start: 2025-02-11

## 2025-02-11 RX ORDER — VALACYCLOVIR HYDROCHLORIDE 500 MG/1
500 TABLET, FILM COATED ORAL 2 TIMES DAILY
Qty: 60 TABLET | Refills: 1 | Status: SHIPPED | OUTPATIENT
Start: 2025-02-11 | End: 2025-03-13

## 2025-02-11 RX ORDER — LANOLIN ALCOHOL/MO/W.PET/CERES
50 CREAM (GRAM) TOPICAL DAILY
Qty: 180 TABLET | Refills: 1 | Status: SHIPPED | OUTPATIENT
Start: 2025-02-11

## 2025-02-11 NOTE — PROGRESS NOTES
Chief Complaint   Patient presents with    Routine Prenatal Visit      Jay Araujo is a 39 y.o.  at 23w0d  here for routine OB visit  Reports left-sided leg pain that goes from her hip down to her foot.  It sometimes feels weak.  She has not had sciatic issues in the past.  She still has had trouble getting her prescriptions from her pharmacy she went after her last visit and they said there were no not available.  She did not start the Procardia and her blood pressures have been at goal  She is certain she wants sterilization  /79   Wt 79.1 kg (174 lb 6.4 oz)   LMP 2024 (Exact Date)   BMI 31.89 kg/m²    Movement: Present   Gen: NAD, well appearing  Abd: nontender      See OB Flowsheet    ASSESSMENT/PLAN:  Diagnoses and all orders for this visit:    1. Screening for blood or protein in urine (Primary)  -     POC Urinalysis Dipstick  -     Treponema pallidum AB w/Reflex RPR  -     CBC (No Diff)  -     Gestational Screen 1 Hr ([facility])    2. Pregnancy, unspecified gestational age  -     Prenat w/o A-FeCbGl-DSS-FA-DHA (CitraNatal DHA) 27-1 & 250 MG tablet; Take 1 tablet by mouth Daily.  Dispense: 60 tablet; Refill: 6    3. Nausea  -     vitamin B-6 (PYRIDOXINE) 50 MG tablet; Take 1 tablet by mouth Daily.  Dispense: 180 tablet; Refill: 1    4. Left sciatic nerve pain    5. Herpes simplex type 2 (HSV-2) infection affecting pregnancy, antepartum    6. History of hypothyroidism  -     TSH Rfx On Abnormal To Free T4    Other orders  -     valACYclovir (Valtrex) 500 MG tablet; Take 1 tablet by mouth 2 (Two) Times a Day for 30 days.  Dispense: 60 tablet; Refill: 1      Reviewed labs next visit including thyroid with glucose, CBC syphilis testing  Reviewed left-sided sciatica and stretches that are safe to do in pregnancy.  Patient was provided a handout.  She will contact us if she would like formal physical therapy especially if the symptoms are getting worse.    We sent a refill of her  prenatal and B6 and will call the pharmacy to see if they are available.  We also discussed HSV suppression.  She has no signs of the lesion no prodromal symptoms.  I discussed the importance of suppression starting around 34 to 35 weeks.  We will go ahead and send the Valtrex to make sure she has it available to her.    Counseled on risks/benefits of bilateral tubal ligation.  She was counseled that this should be considered a permanent procedure.  Although there are surgeries to reverse this, there not 100% successful.  She is adamant that she desires no further childbearing.  We discussed that bilateral tubal ligations usually do not change menstrual cycles, so some women are on hormonal therapy to control bleeding or regularly periods.  We discussed that it also does not prevent sexually transmitted infections and so condoms are recommended to prevent transmission of these diseases.  We discussed other options for contraception including LARCs.  Patient was counseled that there is a risk of failure and if the tubal ligation were to fail, there is an increased risk of ectopic pregnancy.  She was counseled that she missed a period she should take a pregnancy test and if positive be seen by a physician immediately.  We reviewed the risk of regret and desire for future childbearing.  We discussed that this risk is higher in women who have fewer children or or at a younger age.    We discussed methods for tubal ligation/sterilization. We reviewed option for tubal interruption with Filshie clips or electrocautery versus bilateral salpingectomy (removal of fallopian tubes).  If feasible patient elects for bilateral salpingectomy. She is aware that if this is not feasible a reasonable effort will be made to perform a safe sterilization by another method at the time of the surgery.    Patient had all questions answered at the end of this discussion.  She will sign tubal papers at the .     Reviewed common  second trimester symptoms.  Reviewed precautions for signs of  labor, anticipated fetal movements.       Return in about 4 weeks (around 3/11/2025) for GCT, OB visit.

## 2025-03-11 ENCOUNTER — ROUTINE PRENATAL (OUTPATIENT)
Dept: OBSTETRICS AND GYNECOLOGY | Facility: CLINIC | Age: 40
End: 2025-03-11
Payer: COMMERCIAL

## 2025-03-11 VITALS — BODY MASS INDEX: 32.73 KG/M2 | DIASTOLIC BLOOD PRESSURE: 80 MMHG | SYSTOLIC BLOOD PRESSURE: 117 MMHG | WEIGHT: 179 LBS

## 2025-03-11 DIAGNOSIS — O98.519 HERPES SIMPLEX TYPE 2 (HSV-2) INFECTION AFFECTING PREGNANCY, ANTEPARTUM: ICD-10-CM

## 2025-03-11 DIAGNOSIS — O09.522 MULTIGRAVIDA OF ADVANCED MATERNAL AGE IN SECOND TRIMESTER: Primary | ICD-10-CM

## 2025-03-11 DIAGNOSIS — Z3A.27 27 WEEKS GESTATION OF PREGNANCY: ICD-10-CM

## 2025-03-11 DIAGNOSIS — B00.9 HERPES SIMPLEX TYPE 2 (HSV-2) INFECTION AFFECTING PREGNANCY, ANTEPARTUM: ICD-10-CM

## 2025-03-11 DIAGNOSIS — Z86.39 HISTORY OF HYPOTHYROIDISM: ICD-10-CM

## 2025-03-11 DIAGNOSIS — Z67.90 BLOOD TYPE, RH POSITIVE: ICD-10-CM

## 2025-03-11 LAB
GLUCOSE UR STRIP-MCNC: NEGATIVE MG/DL
PROT UR STRIP-MCNC: ABNORMAL MG/DL

## 2025-03-11 RX ORDER — PRENATAL VIT/IRON FUM/FOLIC AC 27MG-0.8MG
1 TABLET ORAL DAILY
COMMUNITY
Start: 2025-02-23

## 2025-03-11 NOTE — PROGRESS NOTES
Patient is not taking NIFEdipine, she said she was not aware of this. The patient is concerned with having HSV Type II, she feel she is having an outbreak, she did not begin taking Valtrex that was prescribed at her last visit. Patient is doing GTT1, CBC, Treb ab today. This intake was provide by  [facility] ID#[id number].    Chief Complaint   Patient presents with    Routine Prenatal Visit      Jay Araujo is a 39 y.o.  at 27w0d  here for routine OB visit  Reports that she had a single lesion arise on the genital area about 1 week ago but is getting better.  She did not take the Valtrex but does have it at home.    She remembers that the nifedipine was sent by her primary care provider due to reports of elevated blood pressures at home.  She has had normal blood pressure since and did not start the Procardia    /80   Wt 81.2 kg (179 lb)   LMP 2024 (Exact Date)   BMI 32.73 kg/m²    Fetal Heart Rate: 150  Movement: Present   Gen: NAD, well appearing  Abd: nontender    See OB Flowsheet    ASSESSMENT/PLAN:  Diagnoses and all orders for this visit:    1. Multigravida of advanced maternal age in second trimester (Primary)  -     POC Urinalysis Dipstick  -     US Ob Follow Up Transabdominal Approach; Future    2. 27 weeks gestation of pregnancy  -     POC Urinalysis Dipstick    3. Herpes simplex type 2 (HSV-2) infection affecting pregnancy, antepartum  -     POC Urinalysis Dipstick    4. Blood type, Rh positive    5. History of hypothyroidism      At this time it is reasonable to hold the Procardia as her blood pressure is in the normal range without treatment.  Recommend suppression therapy with Valtrex twice daily until delivery.  We discussed the risk of transmission to the baby if she were having an active outbreak at the time of delivery.  She did ask about a primary  with tubal ligation.  I discussed that this was an option she could choose but is not  necessarily indicated just by history of HSV without active lesions.  She has signed tubal ligation papers 25  TSH check today with labs  Growth US next visit for AMA, h/o thyroid disease  Discussed Tdap next visit  Reviewed common second trimester symptoms.  Reviewed precautions for signs of  labor, anticipated fetal movements.     Return in about 2 weeks (around 3/25/2025) for growth US, OB visit.

## 2025-03-12 LAB
ERYTHROCYTE [DISTWIDTH] IN BLOOD BY AUTOMATED COUNT: 12.4 % (ref 12.3–15.4)
GLUCOSE 1H P 50 G GLC PO SERPL-MCNC: 128 MG/DL (ref 65–139)
HCT VFR BLD AUTO: 34.9 % (ref 34–46.6)
HGB BLD-MCNC: 11.5 G/DL (ref 12–15.9)
MCH RBC QN AUTO: 28.6 PG (ref 26.6–33)
MCHC RBC AUTO-ENTMCNC: 33 G/DL (ref 31.5–35.7)
MCV RBC AUTO: 86.8 FL (ref 79–97)
PLATELET # BLD AUTO: 248 10*3/MM3 (ref 140–450)
RBC # BLD AUTO: 4.02 10*6/MM3 (ref 3.77–5.28)
TREPONEMA PALLIDUM IGG+IGM AB [PRESENCE] IN SERUM OR PLASMA BY IMMUNOASSAY: NON REACTIVE
TSH SERPL DL<=0.005 MIU/L-ACNC: 1.15 UIU/ML (ref 0.27–4.2)
WBC # BLD AUTO: 5.19 10*3/MM3 (ref 3.4–10.8)

## 2025-03-25 ENCOUNTER — ROUTINE PRENATAL (OUTPATIENT)
Dept: OBSTETRICS AND GYNECOLOGY | Facility: CLINIC | Age: 40
End: 2025-03-25

## 2025-03-25 VITALS — DIASTOLIC BLOOD PRESSURE: 72 MMHG | SYSTOLIC BLOOD PRESSURE: 114 MMHG | BODY MASS INDEX: 32.91 KG/M2 | WEIGHT: 180 LBS

## 2025-03-25 DIAGNOSIS — O09.523 MULTIGRAVIDA OF ADVANCED MATERNAL AGE IN THIRD TRIMESTER: ICD-10-CM

## 2025-03-25 DIAGNOSIS — O26.899 CARPAL TUNNEL SYNDROME DURING PREGNANCY: ICD-10-CM

## 2025-03-25 DIAGNOSIS — Z34.80 SUPERVISION OF OTHER NORMAL PREGNANCY, ANTEPARTUM: Primary | ICD-10-CM

## 2025-03-25 DIAGNOSIS — O98.519 HERPES SIMPLEX TYPE 2 (HSV-2) INFECTION AFFECTING PREGNANCY, ANTEPARTUM: ICD-10-CM

## 2025-03-25 DIAGNOSIS — B00.9 HERPES SIMPLEX TYPE 2 (HSV-2) INFECTION AFFECTING PREGNANCY, ANTEPARTUM: ICD-10-CM

## 2025-03-25 DIAGNOSIS — G56.00 CARPAL TUNNEL SYNDROME DURING PREGNANCY: ICD-10-CM

## 2025-03-25 DIAGNOSIS — Z86.39 H/O: HYPOTHYROIDISM: ICD-10-CM

## 2025-03-25 PROCEDURE — 99213 OFFICE O/P EST LOW 20 MIN: CPT | Performed by: OBSTETRICS & GYNECOLOGY

## 2025-03-25 NOTE — PROGRESS NOTES
Chief Complaint   Patient presents with    Routine Prenatal Visit     Patient states she is having a lot of cramping in her hands mostly her right hand and the pain moves to her shoulder mainly at night for about a week      Jay Araujo is a 39 y.o.  at 29w0d  here for routine OB visit  Reports that she is having pain and numbness in her hands, right > left. Moved yesterday and feels this is making it worse.  Reports right shoulder pain which has been ongoing since before pregnancy. Notes normal fetal movements    /72   Wt 81.6 kg (180 lb)   LMP 2024 (Exact Date)   BMI 32.91 kg/m²    Movement: Present   Gen: NAD, well appearing  Abd: nontender      See OB Flowsheet    ASSESSMENT/PLAN:  Diagnoses and all orders for this visit:    1. Supervision of other normal pregnancy, antepartum (Primary)  -     AMB Referral to [facility] Program (ONLY [place] Medicaid)    2. Multigravida of advanced maternal age in third trimester  -     AMB Referral to [facility] Program (ONLY [place] Medicaid)    3. Carpal tunnel syndrome during pregnancy    4. Herpes simplex type 2 (HSV-2) infection affecting pregnancy, antepartum    5. H/O: hypothyroidism      Reviewed use of braces for wrist pain. Consider ortho/hand referral. Consider referral for shoulder if not improved  Has valtrex and aware to take suppression or PRN outbreak  BPP to start at 32 weeks for AMA  Growth US reviewed  Insurance lapsed in Feb and is trying to obtain  Reviewed common symptoms of the third trimester.  Counseled on labor precautions and anticipated fetal movements.  Reviewed kick counts.  Patient is aware of the location of L&D.     No follow-ups on file.

## 2025-03-26 ENCOUNTER — PATIENT OUTREACH (OUTPATIENT)
Dept: LABOR AND DELIVERY | Facility: HOSPITAL | Age: 40
End: 2025-03-26
Payer: COMMERCIAL

## 2025-03-26 ENCOUNTER — REFERRAL TRIAGE (OUTPATIENT)
Dept: LABOR AND DELIVERY | Facility: HOSPITAL | Age: 40
End: 2025-03-26
Payer: COMMERCIAL

## 2025-03-26 NOTE — OUTREACH NOTE
[facility]  Enrollment    Current Estimated Gestational Age: 29w1d    Questions/Answers      Flowsheet Row Responses   Would like to participate? Yes   Date of Intake Visit 03/26/25        [facility]  Intake    Current Estimated Gestational Age: 29w1d    Intake Assessment      Flowsheet Row Responses   Best Method for Contacting Cell   Currently Employed No   Able to keep appointments as scheduled Yes   Gender(s) and Name(s) girl   Do you have a dentist? Yes   Have you seen a dentist in the last 6 months Yes   Resources Presently Utilizing: WIC (Women, Infant, Children)   Maternal Warning Signs Provided   Other Education How to find a pediatrician, Insurance benefits/Incentives, WIC Benefits, Transportation Assistance            Learning Assessment      Flowsheet Row Responses   Relationship Patient   Learner Name Jay   Does the learner have any barriers to learning? Language   What is the preferred language of the learner for medical teaching? English   Is an  required? Yes   How does the learner prefer to learn new concepts? Listening, Demonstration, Pictures/Video          [facility]  Check-In    Current Estimated Gestational Age: 29w1d      Questions/Answers      Flowsheet Row Responses   Best Method for Contacting Cell   Demographics Reviewed Yes   Currently Employed No   Able to keep appointments as scheduled Yes   Gender(s) and Name(s) girl   Baby Active/Feeling Fetal Movemen Yes   How are you presently feeling? good, some pain form carpal tunnel   Resource/Environmental Concerns Financial   Do you have any questions related to your care experience, your pregnancy, plans for delivery, any concerns, etc? No   Other Education How to find a pediatrician, Insurance benefits/Incentives, WIC Benefits, Transportation Assistance           [id number]. She recently moved to a new home with her  and states that resources are a concern with the expense of  moving. Her  does have a car and is usually able to drive her to [employer]. She recently submitted required documents to renew her Medicaid. She does have WIC but not SNAP. Reviewed ordering her breast pump (once insurance renews) and selecting a pediatrician. Reviewed resources for food and infant supplies and sent info. F/u in two weeks about Medicaid renewal.     Sil Gilliland RN  Maternity Nurse Navigator    3/26/2025, 11:52 EDT

## 2025-04-08 ENCOUNTER — ROUTINE PRENATAL (OUTPATIENT)
Dept: OBSTETRICS AND GYNECOLOGY | Facility: CLINIC | Age: 40
End: 2025-04-08
Payer: MEDICAID

## 2025-04-08 VITALS — BODY MASS INDEX: 32.91 KG/M2 | DIASTOLIC BLOOD PRESSURE: 73 MMHG | WEIGHT: 180 LBS | SYSTOLIC BLOOD PRESSURE: 110 MMHG

## 2025-04-08 DIAGNOSIS — B00.9 HERPES SIMPLEX TYPE 2 (HSV-2) INFECTION AFFECTING PREGNANCY, ANTEPARTUM: ICD-10-CM

## 2025-04-08 DIAGNOSIS — Z3A.31 31 WEEKS GESTATION OF PREGNANCY: ICD-10-CM

## 2025-04-08 DIAGNOSIS — Z86.39 H/O: HYPOTHYROIDISM: ICD-10-CM

## 2025-04-08 DIAGNOSIS — Z23 NEED FOR TDAP VACCINATION: ICD-10-CM

## 2025-04-08 DIAGNOSIS — O09.523 MULTIGRAVIDA OF ADVANCED MATERNAL AGE IN THIRD TRIMESTER: ICD-10-CM

## 2025-04-08 DIAGNOSIS — O98.519 HERPES SIMPLEX TYPE 2 (HSV-2) INFECTION AFFECTING PREGNANCY, ANTEPARTUM: ICD-10-CM

## 2025-04-08 DIAGNOSIS — O26.899 CARPAL TUNNEL SYNDROME DURING PREGNANCY: ICD-10-CM

## 2025-04-08 DIAGNOSIS — Z34.80 SUPERVISION OF OTHER NORMAL PREGNANCY, ANTEPARTUM: Primary | ICD-10-CM

## 2025-04-08 DIAGNOSIS — G56.00 CARPAL TUNNEL SYNDROME DURING PREGNANCY: ICD-10-CM

## 2025-04-08 LAB
GLUCOSE UR STRIP-MCNC: NEGATIVE MG/DL
PROT UR STRIP-MCNC: ABNORMAL MG/DL

## 2025-04-08 NOTE — PROGRESS NOTES
Patient or patient representative verbalized consent for the use of Ambient Listening during the visit with  Angela Shaw MD for chart documentation. 2025  12:52 EDT    History of Present Illness  The patient is a 39-year-old -0-2-1 at 31 weeks and 0 days, here for a routine OB visit. Her pregnancy is complicated by advanced maternal age, carpal tunnel syndrome, a history of herpes, and hypothyroidism.    Carpal Tunnel Syndrome  - Reports using wrist braces, which have been somewhat helpful  - Has not yet seen a specialist for this condition  - Works outside the home  - Interested in the option of wrist injections and will need a referral to an orthopedist  - Experiences more symptoms in her right wrist    Pregnancy  - Confirms that her baby is moving well  - Agrees to receive the Tdap vaccine during this pregnancy    Herpes  - Has Valtrex at home but is not currently experiencing any lesions  - Recommended to start taking Valtrex twice daily at 35 weeks to prevent lesions near delivery    Contractions  - Reports feeling contractions that are not regular and occur less than four times a day  - Has not experienced vaginal bleeding  - Advised to go to the hospital if contractions occur every 15 minutes or more than three times in an hour    Leg Cramps  - Reports occasional leg cramps at night  - Legs are slightly swollen  - Blood pressure is normal  - Advised to monitor for any changes    Supplemental information: None    SOCIAL HISTORY  She works outside the home.    MEDICATIONS  Current: Valtrex (as needed for herpes)    IMMUNIZATIONS  Tdap vaccine recommended and agreed to receive during this pregnancy.      Vitals:    25 1134   BP: 110/73       Physical Exam  Gen: NAD  Brace on right upper extremity  Abd: gravid, nontender    Results       Diagnosis Plan   1. Supervision of other normal pregnancy, antepartum        2. Multigravida of advanced maternal age in third trimester  US Fetal Biophysical  Profile;Without Non-Stress Testing      3. Herpes simplex type 2 (HSV-2) infection affecting pregnancy, antepartum        4. H/O: hypothyroidism        5. 31 weeks gestation of pregnancy  POC Urinalysis Dipstick      6. Carpal tunnel syndrome during pregnancy  Ambulatory Referral to Orthopedic Surgery      7. Need for Tdap vaccination            Assessment & Plan  1. Routine OB visit: 31 weeks and 0 days of gestation with a pregnancy complicated by advanced maternal age, carpal tunnel syndrome, a history of herpes, and hypothyroidism.  - Referral to an orthopedist for wrist injections.  - Tdap vaccine administration during pregnancy.  - Start Valtrex twice daily at 35 weeks to prevent herpes lesions near delivery.  - Monitor contractions; if they occur every 15 minutes or more than three times in an hour, go to the hospital.  - Monitor for changes in leg swelling and cramps.    2. Carpal tunnel syndrome:   - Referral to an orthopedist as brace is not helping. Aware that typically this will spontaneously resolve after pregnancy but interested in seeing if there is an option for injections prior.     3. Herpes:   - Start Valtrex twice daily at 35 weeks to prevent lesions near delivery.    4. Hypothyroidism:   TSH on 3/11/25 within normal limits     5. Health maintenance:   - Tdap vaccine administration during pregnancy.    Follow-up  - Weekly ultrasounds starting next week to measure amniotic fluid, fetal movements, and breathing.  - Referral to an orthopedist for wrist injections.      Angela Shaw MD  04/08/25 12:53 EDT     Return for weekly BPPs, OB visit in 2 weeksus .

## 2025-04-10 ENCOUNTER — PATIENT OUTREACH (OUTPATIENT)
Dept: LABOR AND DELIVERY | Facility: HOSPITAL | Age: 40
End: 2025-04-10
Payer: MEDICAID

## 2025-04-10 NOTE — OUTREACH NOTE
[facility]  Check-In    Current Estimated Gestational Age: 31w2d      Questions/Answers      Flowsheet Row Responses   Best Method for Contacting Cell   Demographics Reviewed Yes   Able to keep appointments as scheduled Yes   Gender(s) and Name(s) girl   Baby Active/Feeling Fetal Movemen Yes   How are you presently feeling? good   Supplies ready for baby Car Seat, Crib   Resource/Environmental Concerns Financial   Do you have any questions related to your care experience, your pregnancy, plans for delivery, any concerns, etc? No   Other Education Insurance benefits/Incentives          Pt reports that everything has been pretty good with the pregnancy, she is feeling active fetal movement. She went to [facility] office for Medicaid renewal and did receive a letter last week but is not sure what it meant. I will contact our [employer]ist team to see if they can at least check her status to see if Medicaid is active. She has started working on infant supplies and now has a car seat and crib. I will plan to f/u as soon as I have update from [facility].     Sil Gilliland RN  Maternity Nurse Navigator    4/10/2025, 14:36 EDT

## 2025-04-15 ENCOUNTER — PATIENT OUTREACH (OUTPATIENT)
Dept: LABOR AND DELIVERY | Facility: HOSPITAL | Age: 40
End: 2025-04-15
Payer: MEDICAID

## 2025-04-15 NOTE — OUTREACH NOTE
[facility]     [id number]. Called pt to review Medicaid status. Ines from [employer] has been assisting and called her today. She needs to send several documents to Ines in order to renew Medicaid by 5/2. Pt is aware and will send documents to her asap. I will plan to check in with her next week.     Sil Gilliland RN  Maternity Nurse Navigator    4/15/2025, 11:42 EDT

## 2025-04-22 ENCOUNTER — ROUTINE PRENATAL (OUTPATIENT)
Dept: OBSTETRICS AND GYNECOLOGY | Facility: CLINIC | Age: 40
End: 2025-04-22
Payer: MEDICAID

## 2025-04-22 VITALS — BODY MASS INDEX: 33.28 KG/M2 | SYSTOLIC BLOOD PRESSURE: 121 MMHG | DIASTOLIC BLOOD PRESSURE: 79 MMHG | WEIGHT: 182 LBS

## 2025-04-22 DIAGNOSIS — Z3A.33 33 WEEKS GESTATION OF PREGNANCY: Primary | ICD-10-CM

## 2025-04-22 LAB
GLUCOSE UR STRIP-MCNC: ABNORMAL MG/DL
PROT UR STRIP-MCNC: ABNORMAL MG/DL

## 2025-04-22 NOTE — PROGRESS NOTES
Patient or patient representative verbalized consent for the use of Ambient Listening during the visit with  Angela Shaw MD for chart documentation. 2025  13:52 EDT    History of Present Illness  The patient is a 39-year-old -0-2-1 at 33 weeks and 0 days gestation presenting for a routine OB visit. Her pregnancy has been complicated by a history of HSV and advanced maternal age, for which she is receiving biophysical profiles (BPPs).    Fever  - Experienced fever day before yesterday  - Currently feeling better    Congestion and Arm Pain  - Unable to sleep due to congestion and pregnancy discomfort    Pelvic Pressure and Back Pain  - Experiencing significant pelvic pressure and back pain    HSV  - Has antiviral at home, not yet taking    Cough and Difficulty Sleeping  - Persistent cough  - Difficulty sleeping  - Given recommendations for over-the-counter medications to alleviate symptoms    Baby's Movements  - Reports that the baby's movements are good      Vitals:    25 1210   BP: 121/79       Physical Exam  Gen: NAD  Pelvic: nl ext genitalia, no lesions    Results       Diagnosis Plan   1. 33 weeks gestation of pregnancy  POC Urinalysis Dipstick          Assessment & Plan  1. Routine OB visit: 33 weeks and 0 days gestation. History of HSV and advanced maternal age, receiving biophysical profiles (BPPs).  - Restart medication to prevent HSV infections in two weeks at the latest  - Recommendations for over-the-counter medications to alleviate symptoms provided    2. Fever and congestion.  - Use Tylenol to reduce fever  - Recommendations for over-the-counter medications such as Robitussin without DM and Sudafed for congestion provided    3. Pelvic pressure and back pain.  - Pelvic pressure likely due to baby's position  - Reassured no cervical dilation    4. Cough and difficulty sleeping.  - Recommendations for over-the-counter medications to help with sleep, such as Unisom (doxylamine),  provided    Follow-up  - Schedule a follow-up appointment in one week for another ultrasound      Angela Shaw MD  04/22/25 13:52 EDT     Return in about 1 week (around 4/29/2025).

## 2025-04-29 ENCOUNTER — ROUTINE PRENATAL (OUTPATIENT)
Dept: OBSTETRICS AND GYNECOLOGY | Facility: CLINIC | Age: 40
End: 2025-04-29
Payer: MEDICAID

## 2025-04-29 VITALS — SYSTOLIC BLOOD PRESSURE: 102 MMHG | WEIGHT: 179 LBS | BODY MASS INDEX: 32.73 KG/M2 | DIASTOLIC BLOOD PRESSURE: 71 MMHG

## 2025-04-29 DIAGNOSIS — Z3A.34 34 WEEKS GESTATION OF PREGNANCY: Primary | ICD-10-CM

## 2025-04-29 LAB
GLUCOSE UR STRIP-MCNC: NEGATIVE MG/DL
PROT UR STRIP-MCNC: ABNORMAL MG/DL

## 2025-04-29 NOTE — PROGRESS NOTES
Patient or patient representative verbalized consent for the use of Ambient Listening during the visit with  Angela Shaw MD for chart documentation. 2025  12:51 EDT    History of Present Illness  The patient is a 39-year-old -0-2-1 who presents for a routine OB visit at 34 weeks and 0 days. She is accompanied by an .    Respiratory symptoms  - Sore throat  - Headache  - Nasal discharge  - Managing symptoms with tea, which she finds beneficial for her throat    Fetal activity  - The fetus is active and moving well    Herpes medication  - She has not yet started her medication regimen for herpes      There were no vitals filed for this visit.    Physical Exam  Gen: NAD    Results  - Laboratory Studies:    - Blood sugar: slightly high    - Protein level: low     Diagnosis Plan   1. 34 weeks gestation of pregnancy  POC Urinalysis Dipstick          Assessment & Plan  1. Routine obstetric visit at 34 weeks and 0 days.  BPP 8/8  - Blood glucose levels slightly elevated in urine last week, potentially due to recent consumption of sweet foods. She reports having milk with sugar.  Today glucose is negative  - Presence of protein in urine noted, which is not uncommon during pregnancy.  - Take allergy medication and Mucinex as needed.  - Recheck blood pressure before leaving the clinic.    2. Herpes: Chronic.  - Start herpes medication, 500 mg once in the morning and once in the afternoon.    Follow-up  - Weekly appointments with BPP  - [facility] working with patient      Angela Shaw MD  25 12:51 EDT     No follow-ups on file.

## 2025-05-05 ENCOUNTER — PATIENT OUTREACH (OUTPATIENT)
Dept: LABOR AND DELIVERY | Facility: HOSPITAL | Age: 40
End: 2025-05-05
Payer: MEDICAID

## 2025-05-05 NOTE — OUTREACH NOTE
[facility]    Ines from [employer] is assisting with Medicaid renewal. Required documents have been submitted so just waiting to hear back about approval now. I will plan to call patient later this week for 35 week check in.     Sil Gilliland RN  Maternity Nurse Navigator    5/5/2025, 08:34 EDT

## 2025-05-07 ENCOUNTER — PATIENT OUTREACH (OUTPATIENT)
Dept: LABOR AND DELIVERY | Facility: HOSPITAL | Age: 40
End: 2025-05-07
Payer: MEDICAID

## 2025-05-07 NOTE — OUTREACH NOTE
[facility]  Check-In    Current Estimated Gestational Age: 35w1d      Questions/Answers      Flowsheet Row Responses   Best Method for Contacting Cell   Demographics Reviewed Yes   Able to keep appointments as scheduled Yes   Gender(s) and Name(s) girl   Baby Active/Feeling Fetal Movemen Yes   How are you presently feeling? good   Supplies ready for baby Car Seat, Crib, Clothing   Resource/Environmental Concerns Financial   Do you have any questions related to your care experience, your pregnancy, plans for delivery, any concerns, etc? No   Other Education Insurance benefits/Incentives, How to find a pediatrician           [id number]. She is aware that [employer] from [facility] is working on her Medicaid case. Reviewed labor precautions and location of L&D. She has some infant supplies but is worried about the safety of their used car seat. Instructed her to bring it to delivery so staff can help determine safety. She will need to get her breast pump while IP. F/u inpatient after delivery.     Sil Gilliland RN  Maternity Nurse Navigator    5/7/2025, 12:18 EDT

## 2025-05-12 ENCOUNTER — TELEPHONE (OUTPATIENT)
Dept: OBSTETRICS AND GYNECOLOGY | Facility: CLINIC | Age: 40
End: 2025-05-12
Payer: MEDICAID

## 2025-05-12 DIAGNOSIS — L29.9 PRURITUS OF PREGNANCY IN THIRD TRIMESTER: Primary | ICD-10-CM

## 2025-05-12 DIAGNOSIS — O99.713 PRURITUS OF PREGNANCY IN THIRD TRIMESTER: Primary | ICD-10-CM

## 2025-05-12 NOTE — TELEPHONE ENCOUNTER
I would be concerned that she could be developing a condition called cholestasis of pregnancy and she needs lab work.  I have placed orders for the blood work and she may come in today to have those done or she can have them done tomorrow when she sees Dr. Shaw.  She can use Benadryl to help with itching until she is seen by Dr. Shaw tomorrow

## 2025-05-12 NOTE — TELEPHONE ENCOUNTER
Dr. Maria,    This is a Shaw pt. She is concerned about persistent itching in her hands and feet that aren't allowing her to sleep. She is also c/o cramping. That she described as all day long cramping. Is there anything she can do about the itching?    Please advise  Thanks Laura

## 2025-05-13 ENCOUNTER — ROUTINE PRENATAL (OUTPATIENT)
Dept: OBSTETRICS AND GYNECOLOGY | Facility: CLINIC | Age: 40
End: 2025-05-13
Payer: MEDICAID

## 2025-05-13 VITALS — BODY MASS INDEX: 33.83 KG/M2 | DIASTOLIC BLOOD PRESSURE: 78 MMHG | WEIGHT: 185 LBS | SYSTOLIC BLOOD PRESSURE: 128 MMHG

## 2025-05-13 DIAGNOSIS — L29.9 ITCHING: ICD-10-CM

## 2025-05-13 DIAGNOSIS — Z3A.36 36 WEEKS GESTATION OF PREGNANCY: Primary | ICD-10-CM

## 2025-05-13 DIAGNOSIS — O98.519 HERPES SIMPLEX TYPE 2 (HSV-2) INFECTION AFFECTING PREGNANCY, ANTEPARTUM: ICD-10-CM

## 2025-05-13 DIAGNOSIS — Z86.39 H/O: HYPOTHYROIDISM: ICD-10-CM

## 2025-05-13 DIAGNOSIS — B00.9 HERPES SIMPLEX TYPE 2 (HSV-2) INFECTION AFFECTING PREGNANCY, ANTEPARTUM: ICD-10-CM

## 2025-05-13 DIAGNOSIS — O09.523 MULTIGRAVIDA OF ADVANCED MATERNAL AGE IN THIRD TRIMESTER: ICD-10-CM

## 2025-05-13 LAB
GLUCOSE UR STRIP-MCNC: ABNORMAL MG/DL
PROT UR STRIP-MCNC: ABNORMAL MG/DL

## 2025-05-13 NOTE — PROGRESS NOTES
Patient or patient representative verbalized consent for the use of Ambient Listening during the visit with  Angela Shaw MD for chart documentation. 2025  12:34 EDT    History of Present Illness  The patient is a 39-year-old -0-2-1 at 36 weeks and 0 days, here for a routine OB visit. Her pregnancy has been complicated by advanced maternal age.    Advanced Maternal Age  - Pregnancy complicated by advanced maternal age    HSV Suppression  - On Valtrex for HSV suppression  - Prescribed Valtrex 500 mg twice daily  - Not adhering to regimen due to concerns about potential side effects, including dizziness  - Has not previously taken this medication  - denies prodromal symptoms    Hypothyroidism  - History of hypothyroidism  - On Synthroid    Allergies  - Penicillin allergy  - Has taken Keflex in the past without any known issues    Pruritus  - Reports experiencing pruritus  - started in last week. Called and had labs placed yesterday but has not gotten them drawn yet  - reports this also occurred last pregnancy.      ALLERGIES  She has a PENICILLIN allergy. She had an allergic reaction to CEFAZOLIN, which caused a burning sensation in her skin.    MEDICATIONS  Current: Valtrex, Synthroid  Past: Keflex      Vitals:    25 1130   BP: 128/78       Physical Exam  Physical Exam  General Appearance: Normal.  Pelvic: nl ext genitalia, no lesions        Results       Diagnosis Plan   1. 36 weeks gestation of pregnancy  POC Urinalysis Dipstick    Strep Grp B WILMER + Reflex - Swab, Vaginal/Rectum      2. Multigravida of advanced maternal age in third trimester        3. Herpes simplex type 2 (HSV-2) infection affecting pregnancy, antepartum        4. H/O: hypothyroidism        5. Itching  Comprehensive Metabolic Panel    Bile Acids, Total    CBC (No Diff)          Assessment & Plan  1. Routine obstetric visit:.  - Conduct group B streptococcus test today.  - Administer intravenous antibiotics during labor if the  test result is positive.  - Determine the most suitable antibiotic considering penicillin allergy.    2. Suspected cholestasis:.  - Perform blood tests today to check bile acid levels.  - Prescribe medication if bile acid levels are elevated.  - Consider early induction of labor around 38 weeks if cholestasis is confirmed.    3. Herpes Simplex Virus (HSV) suppression:.  - Advise taking 500 mg of Valtrex in the morning and 500 mg in the evening.  - Emphasize the importance of medication adherence to prevent HSV lesions during labor and reduce the risk of  herpes infection.    Follow-up  - Check results of blood tests for cholestasis.  - Review group B streptococcus test results.      Angela Shaw MD  25 12:34 EDT     Return for weekly with BPP.

## 2025-05-14 LAB
ALBUMIN SERPL-MCNC: 3.3 G/DL (ref 3.9–4.9)
ALP SERPL-CCNC: 503 IU/L (ref 44–121)
ALT SERPL-CCNC: 10 IU/L (ref 0–32)
AST SERPL-CCNC: 15 IU/L (ref 0–40)
BILIRUB SERPL-MCNC: 0.3 MG/DL (ref 0–1.2)
BUN SERPL-MCNC: 4 MG/DL (ref 6–20)
BUN/CREAT SERPL: 8 (ref 9–23)
CALCIUM SERPL-MCNC: 9.1 MG/DL (ref 8.7–10.2)
CHLORIDE SERPL-SCNC: 108 MMOL/L (ref 96–106)
CO2 SERPL-SCNC: 19 MMOL/L (ref 20–29)
CREAT SERPL-MCNC: 0.52 MG/DL (ref 0.57–1)
EGFRCR SERPLBLD CKD-EPI 2021: 122 ML/MIN/1.73
ERYTHROCYTE [DISTWIDTH] IN BLOOD BY AUTOMATED COUNT: 13.1 % (ref 11.7–15.4)
GLOBULIN SER CALC-MCNC: 2.7 G/DL (ref 1.5–4.5)
GLUCOSE SERPL-MCNC: 92 MG/DL (ref 70–99)
HCT VFR BLD AUTO: 34.3 % (ref 34–46.6)
HGB BLD-MCNC: 10.5 G/DL (ref 11.1–15.9)
MCH RBC QN AUTO: 25.7 PG (ref 26.6–33)
MCHC RBC AUTO-ENTMCNC: 30.6 G/DL (ref 31.5–35.7)
MCV RBC AUTO: 84 FL (ref 79–97)
PLATELET # BLD AUTO: 239 X10E3/UL (ref 150–450)
POTASSIUM SERPL-SCNC: 3.9 MMOL/L (ref 3.5–5.2)
PROT SERPL-MCNC: 6 G/DL (ref 6–8.5)
RBC # BLD AUTO: 4.08 X10E6/UL (ref 3.77–5.28)
SODIUM SERPL-SCNC: 139 MMOL/L (ref 134–144)
WBC # BLD AUTO: 5.4 X10E3/UL (ref 3.4–10.8)

## 2025-05-15 ENCOUNTER — RESULTS FOLLOW-UP (OUTPATIENT)
Dept: OBSTETRICS AND GYNECOLOGY | Facility: CLINIC | Age: 40
End: 2025-05-15
Payer: MEDICAID

## 2025-05-15 LAB
BILE AC SERPL-SCNC: 5.2 UMOL/L (ref 0–10)
GP B STREP DNA SPEC QL NAA+PROBE: NEGATIVE

## 2025-05-15 NOTE — TELEPHONE ENCOUNTER
Please call patient and explain that a lot of the low and high levels are within the normal range for pregnancy. The limits on the computer are set for non pregnant patients.  For example, her complete blood count showed that her hemoglobin is 10.5.  Normal hemoglobin in pregnancy is 11 for the first trimester, 10.5 for the second, and 11 for the third, so this result is only mildly low. She can start an iron supplement if she wishes (let me know and I will send to her pharmacy).  Creatinine and BUN are typically low in pregnancy as the kidneys are working faster. Chloride is normal up to a level of 109 in the third trimester of pregnancy.  And albumin is normal between 2.3 and 4.2 in the third trimester.  The alkaline phophatase is the only value that is over the normal limit.  The placenta produces alkaline phosphatase, so this value is higher than normal in pregnancy but 503 is a bit higher than we typically see.  I would still recommend repeating labs on Tuesday, but call if any symptoms change such as right upper abdominal pain.

## 2025-05-15 NOTE — TELEPHONE ENCOUNTER
Please let patient know that bile acids and liver enzymes (AST and ALT) were normal so at this time it does not appear consistent with cholestasis.  If she is still itching, we can repeat these to make sure they remain stable. Thanks!

## 2025-05-15 NOTE — TELEPHONE ENCOUNTER
Pt aware  States she is still experiencing itching. Does pt need to have labs repeated again soon or ok to wait?

## 2025-05-20 ENCOUNTER — ROUTINE PRENATAL (OUTPATIENT)
Dept: OBSTETRICS AND GYNECOLOGY | Facility: CLINIC | Age: 40
End: 2025-05-20
Payer: MEDICAID

## 2025-05-20 VITALS — DIASTOLIC BLOOD PRESSURE: 85 MMHG | SYSTOLIC BLOOD PRESSURE: 117 MMHG | WEIGHT: 184 LBS | BODY MASS INDEX: 33.64 KG/M2

## 2025-05-20 DIAGNOSIS — R74.8 ELEVATED ALKALINE PHOSPHATASE LEVEL: ICD-10-CM

## 2025-05-20 DIAGNOSIS — O09.523 MULTIGRAVIDA OF ADVANCED MATERNAL AGE IN THIRD TRIMESTER: ICD-10-CM

## 2025-05-20 DIAGNOSIS — Z3A.37 37 WEEKS GESTATION OF PREGNANCY: Primary | ICD-10-CM

## 2025-05-20 DIAGNOSIS — B00.9 HERPES SIMPLEX TYPE 2 (HSV-2) INFECTION AFFECTING PREGNANCY, ANTEPARTUM: ICD-10-CM

## 2025-05-20 DIAGNOSIS — Z86.39 H/O: HYPOTHYROIDISM: ICD-10-CM

## 2025-05-20 DIAGNOSIS — O98.519 HERPES SIMPLEX TYPE 2 (HSV-2) INFECTION AFFECTING PREGNANCY, ANTEPARTUM: ICD-10-CM

## 2025-05-20 LAB
GLUCOSE UR STRIP-MCNC: NEGATIVE MG/DL
PROT UR STRIP-MCNC: ABNORMAL MG/DL

## 2025-05-20 NOTE — PROGRESS NOTES
Patient or patient representative verbalized consent for the use of Ambient Listening during the visit with  Angela Shaw MD for chart documentation. 2025  12:11 EDT    History of Present Illness  The patient is a 39-year-old -0-2-1 at 37 weeks and 0 days here for a routine OB visit. Her pregnancy has been complicated by advanced maternal age, for which she has been receiving serial BPPs.    Abdominal Discomfort  - Experiences occasional abdominal discomfort, which she attributes to gas accumulation.  - Has not experienced any episodes of vomiting.    Fetal Health  - Had a normal growth ultrasound today and a biophysical profile that was 8 out of 8.  - The estimated fetal weight is 6 pounds 9 ounces.  - Reports normal fetal movements.    Itching and Lab Results  - Last week, she complained of itching and underwent a CMP, bile acids, and a CBC.  - Her CBC was significant for hemoglobin 10.5.  - Her bile acids were normal at 5.  - Her CMP showed an elevated alkaline phosphatase at 503.    Supplemental information: She reports no history of hepatic calculi or associated complications.      Vitals:    25 1149   BP: 117/85       Physical Exam  Gen: NAD  Pelvic: nl ext genitalia  Cervix soft and posterior 1-2/50/-3    Results  - Laboratory Studies:    - Hemoglobin: 10.5    - Bile acids: normal at 5    - Alkaline phosphatase: elevated at 503    - Imaging:    - Biophysical profile: 8 out of 8    - Estimated fetal weight: 6 pounds 9 ounces     Diagnosis Plan   1. 37 weeks gestation of pregnancy  POC Urinalysis Dipstick      2. Multigravida of advanced maternal age in third trimester        3. H/O: hypothyroidism        4. Herpes simplex type 2 (HSV-2) infection affecting pregnancy, antepartum        5. Elevated alkaline phosphatase level  Comprehensive Metabolic Panel    Bile Acids, Total    CBC (No Diff)          Assessment & Plan  1. Routine obstetric visit: Stable.  - Blood pressure reading normal  - Of  note, her partner requested BP check due to chest pain and staff took it prior to the appointment it was 140s SBP (DBP normal) per report. I advised him given symptoms to go to Urgent Care or ER.  I also advised long term to establish care with a primary care provider.    - Growth US and BPP within normal limits     2. Elevated alkaline phosphatase: Stable.  - CMP showed alkaline phosphatase at 503.  - Repeat CMP ordered today to monitor levels.  - If levels remain high post-pregnancy, a liver ultrasound will be conducted to evaluate for potential gallstones or other liver-related issues.  - Advised to go to the hospital if significant pain or vomiting occurs.    Follow-up  - Repeat CMP results will be reviewed.      Angela Shaw MD  05/20/25 12:13 EDT     Return in about 1 week (around 5/27/2025) for BPP, OB visit.

## 2025-05-21 ENCOUNTER — RESULTS FOLLOW-UP (OUTPATIENT)
Dept: OBSTETRICS AND GYNECOLOGY | Facility: CLINIC | Age: 40
End: 2025-05-21
Payer: MEDICAID

## 2025-05-21 LAB
ALBUMIN SERPL-MCNC: 3.3 G/DL (ref 3.9–4.9)
ALP SERPL-CCNC: 581 IU/L (ref 44–121)
ALT SERPL-CCNC: 12 IU/L (ref 0–32)
AST SERPL-CCNC: 16 IU/L (ref 0–40)
BILIRUB SERPL-MCNC: 0.3 MG/DL (ref 0–1.2)
BUN SERPL-MCNC: 2 MG/DL (ref 6–20)
BUN/CREAT SERPL: 3 (ref 9–23)
CALCIUM SERPL-MCNC: 9 MG/DL (ref 8.7–10.2)
CHLORIDE SERPL-SCNC: 107 MMOL/L (ref 96–106)
CO2 SERPL-SCNC: 19 MMOL/L (ref 20–29)
CREAT SERPL-MCNC: 0.59 MG/DL (ref 0.57–1)
EGFRCR SERPLBLD CKD-EPI 2021: 118 ML/MIN/1.73
ERYTHROCYTE [DISTWIDTH] IN BLOOD BY AUTOMATED COUNT: 13.4 % (ref 11.7–15.4)
GLOBULIN SER CALC-MCNC: 2.8 G/DL (ref 1.5–4.5)
GLUCOSE SERPL-MCNC: 65 MG/DL (ref 70–99)
HCT VFR BLD AUTO: 35.1 % (ref 34–46.6)
HGB BLD-MCNC: 10.6 G/DL (ref 11.1–15.9)
MCH RBC QN AUTO: 25.5 PG (ref 26.6–33)
MCHC RBC AUTO-ENTMCNC: 30.2 G/DL (ref 31.5–35.7)
MCV RBC AUTO: 84 FL (ref 79–97)
PLATELET # BLD AUTO: 236 X10E3/UL (ref 150–450)
POTASSIUM SERPL-SCNC: 4.3 MMOL/L (ref 3.5–5.2)
PROT SERPL-MCNC: 6.1 G/DL (ref 6–8.5)
RBC # BLD AUTO: 4.16 X10E6/UL (ref 3.77–5.28)
SODIUM SERPL-SCNC: 138 MMOL/L (ref 134–144)
WBC # BLD AUTO: 4.9 X10E3/UL (ref 3.4–10.8)

## 2025-05-21 NOTE — TELEPHONE ENCOUNTER
Please let patient know that her labs showed the alkaline phosphatase (which is the value we had discussed was previously elevated) is slightly higher.  The placenta does contribute to this so we will check this postpartum.  The other abnormal values in her CMP are mostly within normal limits for pregnancy aside from her glucose was a little low at 65, but this can be due to not recently eating.  Hgb was stable at 10.6 (from 10.5 last week). Bile acids are not yet back.

## 2025-05-22 LAB — BILE AC SERPL-SCNC: 4.2 UMOL/L (ref 0–10)

## 2025-05-22 NOTE — PROGRESS NOTES
Patient aware of results and is wanting to know if there is something she can do to lower this number. She is really worried as she is almost due.

## 2025-05-23 NOTE — TELEPHONE ENCOUNTER
Returned call-left general message for patient to call office at her convenience. Call via phone  [phone]. Please see Dr. Shaw's message regarding nothing to do about labs at this time.

## 2025-05-27 ENCOUNTER — ROUTINE PRENATAL (OUTPATIENT)
Dept: OBSTETRICS AND GYNECOLOGY | Facility: CLINIC | Age: 40
End: 2025-05-27

## 2025-05-27 VITALS — BODY MASS INDEX: 34.01 KG/M2 | SYSTOLIC BLOOD PRESSURE: 122 MMHG | WEIGHT: 186 LBS | DIASTOLIC BLOOD PRESSURE: 73 MMHG

## 2025-05-27 DIAGNOSIS — O09.523 MULTIGRAVIDA OF ADVANCED MATERNAL AGE IN THIRD TRIMESTER: ICD-10-CM

## 2025-05-27 DIAGNOSIS — R74.8 ELEVATED ALKALINE PHOSPHATASE LEVEL: ICD-10-CM

## 2025-05-27 DIAGNOSIS — Z3A.38 38 WEEKS GESTATION OF PREGNANCY: Primary | ICD-10-CM

## 2025-05-27 LAB
GLUCOSE UR STRIP-MCNC: ABNORMAL MG/DL
PROT UR STRIP-MCNC: ABNORMAL MG/DL

## 2025-05-27 NOTE — PROGRESS NOTES
Patient or patient representative verbalized consent for the use of Ambient Listening during the visit with  Angela Shaw MD for chart documentation. 2025  12:29 EDT    History of Present Illness  The patient is a 39-year-old -0-2-1 at 38 weeks, here for a routine OB visit.    Persistent itching  - Pregnancy has been complicated by persistent itching  - Bile acids have remained normal    Alkaline phosphatase  - Alkaline phosphatase is outside of the range anticipated for pregnancy  - This has been monitored    Advanced maternal age  - Receives serial BPPs for advanced maternal age  - Today's BPP showed an TOBI of 15.6 and was 8 out of 8    Swelling and discoloration  - Experiencing swelling in her heels  - Swelling in her right foot is slightly more pronounced than in the left    Visual disturbances  - Does not report any headaches or visual disturbances such as floaters  - Experienced a transient episode of seeing lights while showering, which she attributes to a change in temperature and it resolved on its own    Blood pressure  - Blood pressure is normal at 122/73    Supplemental information: She expresses concern about the possibility of preeclampsia.      Vitals:    25 1145   BP: 122/73       Physical Exam  Gen: NAD  Lower ext: 1+ bilateral pitting edema without signs of VTE    Results  - Laboratory Studies:    - Bile acids: normal    - Alkaline phosphatase: outside of the range anticipated for pregnancy    - Creatinine level: 0.52 (two weeks ago), 0.59 (last week)    - Imaging:    - BPP:      - TOBI: 15.6      - Score: 8 out of 8     Diagnosis Plan   1. 38 weeks gestation of pregnancy  POC Urinalysis Dipstick      2. Multigravida of advanced maternal age in third trimester  Labor Induction      3. Elevated alkaline phosphatase level  Comprehensive Metabolic Panel    Bile Acids, Total    CBC (No Diff)          Assessment & Plan  1. Routine obstetric visit: Stable.  - Blood pressure readings are  within the normal range, negating the need for early induction prior to the 39th week of gestation.  - Elevated alkaline phosphatase levels could be attributed to placental production.  - Slight increase in swelling in the right foot is a common occurrence during pregnancy due to the baby's head exerting pressure on the veins, leading to impaired circulation.  - Schedule consultation at the hospital on 06/04/2025 to initiate the induction process.  - Conduct blood test to monitor alkaline phosphatase levels.  - If alkaline phosphatase levels remain high postpartum, further investigations will be warranted, including a liver ultrasound and specific hormone tests related to non-placental alkaline phosphatase.  - Perform ultrasound during next visit.  - Discuss details of the induction process, including medications used and reasons for their use, which may vary depending on dilation status.  - Seek immediate medical attention at the hospital if experiencing persistent headaches or visual disturbances.    Follow-up  - Scheduled for a follow-up visit next week.      Angela Shaw MD  05/27/25 12:29 EDT     Return in about 1 week (around 6/3/2025) for BPP, OB visit.

## 2025-05-27 NOTE — Clinical Note
Please see if patient can be scheduled for induction of labor for AMA on Wednesday 6/4 at 5PM. Thanks!

## 2025-05-28 LAB
ALBUMIN SERPL-MCNC: 3.4 G/DL (ref 3.9–4.9)
ALP SERPL-CCNC: 614 IU/L (ref 44–121)
ALT SERPL-CCNC: 12 IU/L (ref 0–32)
AST SERPL-CCNC: 17 IU/L (ref 0–40)
BILE AC SERPL-SCNC: 5.5 UMOL/L (ref 0–10)
BILIRUB SERPL-MCNC: 0.3 MG/DL (ref 0–1.2)
BUN SERPL-MCNC: 3 MG/DL (ref 6–20)
BUN/CREAT SERPL: 6 (ref 9–23)
CALCIUM SERPL-MCNC: 9.1 MG/DL (ref 8.7–10.2)
CHLORIDE SERPL-SCNC: 108 MMOL/L (ref 96–106)
CO2 SERPL-SCNC: 18 MMOL/L (ref 20–29)
CREAT SERPL-MCNC: 0.52 MG/DL (ref 0.57–1)
EGFRCR SERPLBLD CKD-EPI 2021: 122 ML/MIN/1.73
ERYTHROCYTE [DISTWIDTH] IN BLOOD BY AUTOMATED COUNT: 13.6 % (ref 11.7–15.4)
GLOBULIN SER CALC-MCNC: 2.7 G/DL (ref 1.5–4.5)
GLUCOSE SERPL-MCNC: 63 MG/DL (ref 70–99)
HCT VFR BLD AUTO: 35.6 % (ref 34–46.6)
HGB BLD-MCNC: 10.8 G/DL (ref 11.1–15.9)
MCH RBC QN AUTO: 25.5 PG (ref 26.6–33)
MCHC RBC AUTO-ENTMCNC: 30.3 G/DL (ref 31.5–35.7)
MCV RBC AUTO: 84 FL (ref 79–97)
PLATELET # BLD AUTO: 228 X10E3/UL (ref 150–450)
POTASSIUM SERPL-SCNC: 4.2 MMOL/L (ref 3.5–5.2)
PROT SERPL-MCNC: 6.1 G/DL (ref 6–8.5)
RBC # BLD AUTO: 4.23 X10E6/UL (ref 3.77–5.28)
SODIUM SERPL-SCNC: 141 MMOL/L (ref 134–144)
WBC # BLD AUTO: 4.7 X10E3/UL (ref 3.4–10.8)

## 2025-06-03 ENCOUNTER — ROUTINE PRENATAL (OUTPATIENT)
Dept: OBSTETRICS AND GYNECOLOGY | Facility: CLINIC | Age: 40
End: 2025-06-03
Payer: COMMERCIAL

## 2025-06-03 VITALS — BODY MASS INDEX: 34.19 KG/M2 | SYSTOLIC BLOOD PRESSURE: 122 MMHG | DIASTOLIC BLOOD PRESSURE: 74 MMHG | WEIGHT: 187 LBS

## 2025-06-03 DIAGNOSIS — Z3A.39 39 WEEKS GESTATION OF PREGNANCY: Primary | ICD-10-CM

## 2025-06-03 DIAGNOSIS — R74.8 ELEVATED ALKALINE PHOSPHATASE LEVEL: ICD-10-CM

## 2025-06-03 DIAGNOSIS — O09.523 MULTIGRAVIDA OF ADVANCED MATERNAL AGE IN THIRD TRIMESTER: ICD-10-CM

## 2025-06-03 LAB
GLUCOSE UR STRIP-MCNC: NEGATIVE MG/DL
PROT UR STRIP-MCNC: ABNORMAL MG/DL

## 2025-06-03 NOTE — PROGRESS NOTES
Patient or patient representative verbalized consent for the use of Ambient Listening during the visit with  Angela Shaw MD for chart documentation. 6/3/2025  12:04 EDT    History of Present Illness  The patient is a 39-year-old -0-2-1 at 39 weeks, here for a routine OB visit.    Elevated Alkaline Phosphatase  - Her pregnancy has been complicated by an elevated alkaline phosphatase.    Itching  - She has also had some itching.    Fetal Movement  - She reports satisfactory fetal movement.    Pain  - She reports the absence of any pain since her last appointment.    Supplemental information: Her plan is for an induction on Thursday. She has scheduled an appointment at the hospital for tomorrow at 5:00 PM.      Vitals:    25 1131   BP: 122/74       Physical Exam  Gen: NAD  Pelvic: nl ext genitalia    Results       Diagnosis Plan   1. 39 weeks gestation of pregnancy  POC Urinalysis Dipstick      2. Multigravida of advanced maternal age in third trimester        3. Elevated alkaline phosphatase level            Assessment & Plan  1. Routine obstetric visit: 39 weeks of gestation. Elevated alkaline phosphatase. Itching.  - Contact the hospital tomorrow at 3:00 PM to confirm room availability.  - Induction scheduled for Thursday using intravenous oxytocin or Pitocin to initiate labor.  - Epidural available upon request.  - Blood work to monitor alkaline phosphatase levels post-delivery.  - Rest at home if no room is available and wait for a call from the hospital.    Follow-up  - Postpartum follow-up visit scheduled in 6 weeks.      Angela Shaw MD  25 12:04 EDT     Return in about 6 weeks (around 7/15/2025) for PP visit.

## 2025-06-05 ENCOUNTER — HOSPITAL ENCOUNTER (INPATIENT)
Facility: HOSPITAL | Age: 40
LOS: 3 days | Discharge: HOME | End: 2025-06-08
Attending: OBSTETRICS & GYNECOLOGY | Admitting: OBSTETRICS & GYNECOLOGY
Payer: COMMERCIAL

## 2025-06-05 ENCOUNTER — ANESTHESIA EVENT (OUTPATIENT)
Dept: LABOR AND DELIVERY | Facility: HOSPITAL | Age: 40
End: 2025-06-05
Payer: COMMERCIAL

## 2025-06-05 ENCOUNTER — ANESTHESIA (OUTPATIENT)
Dept: LABOR AND DELIVERY | Facility: HOSPITAL | Age: 40
End: 2025-06-05
Payer: COMMERCIAL

## 2025-06-05 DIAGNOSIS — Z3A.39 39 WEEKS GESTATION OF PREGNANCY: Primary | ICD-10-CM

## 2025-06-05 PROBLEM — Z34.90 PREGNANCY: Status: ACTIVE | Noted: 2025-06-05

## 2025-06-05 LAB
ABO GROUP BLD: NORMAL
ALBUMIN SERPL-MCNC: 3.3 G/DL (ref 3.5–5.2)
ALBUMIN/GLOB SERPL: 1 G/DL
ALP SERPL-CCNC: 671 U/L (ref 39–117)
ALT SERPL W P-5'-P-CCNC: 9 U/L (ref 1–33)
ANION GAP SERPL CALCULATED.3IONS-SCNC: 13 MMOL/L (ref 5–15)
AST SERPL-CCNC: 19 U/L (ref 1–32)
B PARAPERT DNA SPEC QL NAA+PROBE: NOT DETECTED
B PERT DNA SPEC QL NAA+PROBE: NOT DETECTED
BASOPHILS # BLD AUTO: 0.02 10*3/MM3 (ref 0–0.2)
BASOPHILS NFR BLD AUTO: 0.5 % (ref 0–1.5)
BILIRUB SERPL-MCNC: 0.3 MG/DL (ref 0–1.2)
BLD GP AB SCN SERPL QL: NEGATIVE
BUN SERPL-MCNC: 3 MG/DL (ref 6–20)
BUN/CREAT SERPL: 5.9 (ref 7–25)
C PNEUM DNA NPH QL NAA+NON-PROBE: NOT DETECTED
CALCIUM SPEC-SCNC: 9 MG/DL (ref 8.6–10.5)
CHLORIDE SERPL-SCNC: 108 MMOL/L (ref 98–107)
CO2 SERPL-SCNC: 17 MMOL/L (ref 22–29)
CREAT SERPL-MCNC: 0.51 MG/DL (ref 0.57–1)
DEPRECATED RDW RBC AUTO: 39.6 FL (ref 37–54)
EGFRCR SERPLBLD CKD-EPI 2021: 122.7 ML/MIN/1.73
EOSINOPHIL # BLD AUTO: 0.04 10*3/MM3 (ref 0–0.4)
EOSINOPHIL NFR BLD AUTO: 0.9 % (ref 0.3–6.2)
ERYTHROCYTE [DISTWIDTH] IN BLOOD BY AUTOMATED COUNT: 13.8 % (ref 12.3–15.4)
FLUAV SUBTYP SPEC NAA+PROBE: NOT DETECTED
FLUBV RNA ISLT QL NAA+PROBE: NOT DETECTED
GLOBULIN UR ELPH-MCNC: 3.3 GM/DL
GLUCOSE SERPL-MCNC: 81 MG/DL (ref 65–99)
HADV DNA SPEC NAA+PROBE: NOT DETECTED
HCOV 229E RNA SPEC QL NAA+PROBE: NOT DETECTED
HCOV HKU1 RNA SPEC QL NAA+PROBE: NOT DETECTED
HCOV NL63 RNA SPEC QL NAA+PROBE: NOT DETECTED
HCOV OC43 RNA SPEC QL NAA+PROBE: NOT DETECTED
HCT VFR BLD AUTO: 33.7 % (ref 34–46.6)
HGB BLD-MCNC: 10.7 G/DL (ref 12–15.9)
HMPV RNA NPH QL NAA+NON-PROBE: NOT DETECTED
HPIV1 RNA ISLT QL NAA+PROBE: NOT DETECTED
HPIV2 RNA SPEC QL NAA+PROBE: NOT DETECTED
HPIV3 RNA NPH QL NAA+PROBE: NOT DETECTED
HPIV4 P GENE NPH QL NAA+PROBE: NOT DETECTED
IMM GRANULOCYTES # BLD AUTO: 0.03 10*3/MM3 (ref 0–0.05)
IMM GRANULOCYTES NFR BLD AUTO: 0.7 % (ref 0–0.5)
LYMPHOCYTES # BLD AUTO: 1.26 10*3/MM3 (ref 0.7–3.1)
LYMPHOCYTES NFR BLD AUTO: 28.6 % (ref 19.6–45.3)
M PNEUMO IGG SER IA-ACNC: NOT DETECTED
MCH RBC QN AUTO: 25.4 PG (ref 26.6–33)
MCHC RBC AUTO-ENTMCNC: 31.8 G/DL (ref 31.5–35.7)
MCV RBC AUTO: 79.9 FL (ref 79–97)
MONOCYTES # BLD AUTO: 0.66 10*3/MM3 (ref 0.1–0.9)
MONOCYTES NFR BLD AUTO: 15 % (ref 5–12)
NEUTROPHILS NFR BLD AUTO: 2.39 10*3/MM3 (ref 1.7–7)
NEUTROPHILS NFR BLD AUTO: 54.3 % (ref 42.7–76)
NRBC BLD AUTO-RTO: 0 /100 WBC (ref 0–0.2)
PLATELET # BLD AUTO: 230 10*3/MM3 (ref 140–450)
PMV BLD AUTO: 10.7 FL (ref 6–12)
POTASSIUM SERPL-SCNC: 3.8 MMOL/L (ref 3.5–5.2)
PROT SERPL-MCNC: 6.6 G/DL (ref 6–8.5)
RBC # BLD AUTO: 4.22 10*6/MM3 (ref 3.77–5.28)
RH BLD: POSITIVE
RHINOVIRUS RNA SPEC NAA+PROBE: DETECTED
RSV RNA NPH QL NAA+NON-PROBE: NOT DETECTED
SARS-COV-2 RNA NPH QL NAA+NON-PROBE: NOT DETECTED
SODIUM SERPL-SCNC: 138 MMOL/L (ref 136–145)
T&S EXPIRATION DATE: NORMAL
TREPONEMA PALLIDUM IGG+IGM AB [PRESENCE] IN SERUM OR PLASMA BY IMMUNOASSAY: NORMAL
WBC NRBC COR # BLD AUTO: 4.4 10*3/MM3 (ref 3.4–10.8)

## 2025-06-05 PROCEDURE — 25810000003 LACTATED RINGERS PER 1000 ML: Performed by: OBSTETRICS & GYNECOLOGY

## 2025-06-05 PROCEDURE — 3E033VJ INTRODUCTION OF OTHER HORMONE INTO PERIPHERAL VEIN, PERCUTANEOUS APPROACH: ICD-10-PCS | Performed by: OBSTETRICS & GYNECOLOGY

## 2025-06-05 PROCEDURE — 86850 RBC ANTIBODY SCREEN: CPT | Performed by: OBSTETRICS & GYNECOLOGY

## 2025-06-05 PROCEDURE — 85025 COMPLETE CBC W/AUTO DIFF WBC: CPT | Performed by: OBSTETRICS & GYNECOLOGY

## 2025-06-05 PROCEDURE — 86780 TREPONEMA PALLIDUM: CPT | Performed by: OBSTETRICS & GYNECOLOGY

## 2025-06-05 PROCEDURE — 86900 BLOOD TYPING SEROLOGIC ABO: CPT | Performed by: OBSTETRICS & GYNECOLOGY

## 2025-06-05 PROCEDURE — 86901 BLOOD TYPING SEROLOGIC RH(D): CPT | Performed by: OBSTETRICS & GYNECOLOGY

## 2025-06-05 PROCEDURE — 80053 COMPREHEN METABOLIC PANEL: CPT | Performed by: OBSTETRICS & GYNECOLOGY

## 2025-06-05 PROCEDURE — 0202U NFCT DS 22 TRGT SARS-COV-2: CPT | Performed by: OBSTETRICS & GYNECOLOGY

## 2025-06-05 PROCEDURE — 25010000002 BUTORPHANOL PER 1 MG: Performed by: OBSTETRICS & GYNECOLOGY

## 2025-06-05 PROCEDURE — 0U7C7DJ DILATION OF CERVIX WITH INTRALUM DEV, TEMP, VIA OPENING: ICD-10-PCS | Performed by: OBSTETRICS & GYNECOLOGY

## 2025-06-05 RX ORDER — SODIUM CHLORIDE, SODIUM LACTATE, POTASSIUM CHLORIDE, CALCIUM CHLORIDE 600; 310; 30; 20 MG/100ML; MG/100ML; MG/100ML; MG/100ML
125 INJECTION, SOLUTION INTRAVENOUS CONTINUOUS
Status: DISCONTINUED | OUTPATIENT
Start: 2025-06-05 | End: 2025-06-06

## 2025-06-05 RX ORDER — EPHEDRINE SULFATE 50 MG/ML
5 INJECTION, SOLUTION INTRAVENOUS
Status: DISCONTINUED | OUTPATIENT
Start: 2025-06-05 | End: 2025-06-06

## 2025-06-05 RX ORDER — LIDOCAINE HYDROCHLORIDE 10 MG/ML
0.5 INJECTION, SOLUTION INFILTRATION; PERINEURAL ONCE AS NEEDED
Status: DISCONTINUED | OUTPATIENT
Start: 2025-06-05 | End: 2025-06-06

## 2025-06-05 RX ORDER — FAMOTIDINE 10 MG/ML
20 INJECTION, SOLUTION INTRAVENOUS 2 TIMES DAILY PRN
Status: DISCONTINUED | OUTPATIENT
Start: 2025-06-05 | End: 2025-06-06

## 2025-06-05 RX ORDER — DIPHENHYDRAMINE HYDROCHLORIDE 50 MG/ML
12.5 INJECTION, SOLUTION INTRAMUSCULAR; INTRAVENOUS EVERY 8 HOURS PRN
Status: DISCONTINUED | OUTPATIENT
Start: 2025-06-05 | End: 2025-06-06

## 2025-06-05 RX ORDER — TERBUTALINE SULFATE 1 MG/ML
0.25 INJECTION SUBCUTANEOUS AS NEEDED
Status: DISCONTINUED | OUTPATIENT
Start: 2025-06-05 | End: 2025-06-06

## 2025-06-05 RX ORDER — ACETAMINOPHEN 325 MG/1
650 TABLET ORAL EVERY 4 HOURS PRN
Status: DISCONTINUED | OUTPATIENT
Start: 2025-06-05 | End: 2025-06-06

## 2025-06-05 RX ORDER — SODIUM CHLORIDE 9 MG/ML
40 INJECTION, SOLUTION INTRAVENOUS AS NEEDED
Status: DISCONTINUED | OUTPATIENT
Start: 2025-06-05 | End: 2025-06-06

## 2025-06-05 RX ORDER — BUTORPHANOL TARTRATE 2 MG/ML
2 INJECTION, SOLUTION INTRAMUSCULAR; INTRAVENOUS
Status: DISCONTINUED | OUTPATIENT
Start: 2025-06-05 | End: 2025-06-06

## 2025-06-05 RX ORDER — FAMOTIDINE 20 MG/1
20 TABLET, FILM COATED ORAL 2 TIMES DAILY PRN
Status: DISCONTINUED | OUTPATIENT
Start: 2025-06-05 | End: 2025-06-06

## 2025-06-05 RX ORDER — FENTANYL/ROPIVACAINE/NS/PF 2MCG/ML-.2
10 PLASTIC BAG, INJECTION (ML) INJECTION CONTINUOUS
Refills: 0 | Status: DISCONTINUED | OUTPATIENT
Start: 2025-06-05 | End: 2025-06-06

## 2025-06-05 RX ORDER — SODIUM CHLORIDE 0.9 % (FLUSH) 0.9 %
10 SYRINGE (ML) INJECTION AS NEEDED
Status: DISCONTINUED | OUTPATIENT
Start: 2025-06-05 | End: 2025-06-06

## 2025-06-05 RX ORDER — ONDANSETRON 4 MG/1
4 TABLET, ORALLY DISINTEGRATING ORAL EVERY 6 HOURS PRN
Status: DISCONTINUED | OUTPATIENT
Start: 2025-06-05 | End: 2025-06-06

## 2025-06-05 RX ORDER — ONDANSETRON 2 MG/ML
4 INJECTION INTRAMUSCULAR; INTRAVENOUS EVERY 6 HOURS PRN
Status: DISCONTINUED | OUTPATIENT
Start: 2025-06-05 | End: 2025-06-06

## 2025-06-05 RX ORDER — ONDANSETRON 2 MG/ML
4 INJECTION INTRAMUSCULAR; INTRAVENOUS ONCE AS NEEDED
Status: DISCONTINUED | OUTPATIENT
Start: 2025-06-05 | End: 2025-06-06

## 2025-06-05 RX ORDER — SODIUM CHLORIDE 0.9 % (FLUSH) 0.9 %
10 SYRINGE (ML) INJECTION EVERY 12 HOURS SCHEDULED
Status: DISCONTINUED | OUTPATIENT
Start: 2025-06-05 | End: 2025-06-06

## 2025-06-05 RX ORDER — FAMOTIDINE 10 MG/ML
20 INJECTION, SOLUTION INTRAVENOUS ONCE AS NEEDED
Status: DISCONTINUED | OUTPATIENT
Start: 2025-06-05 | End: 2025-06-06

## 2025-06-05 RX ORDER — MAGNESIUM CARB/ALUMINUM HYDROX 105-160MG
30 TABLET,CHEWABLE ORAL ONCE AS NEEDED
Status: DISCONTINUED | OUTPATIENT
Start: 2025-06-05 | End: 2025-06-06

## 2025-06-05 RX ORDER — VALACYCLOVIR HYDROCHLORIDE 500 MG/1
500 TABLET, FILM COATED ORAL EVERY 12 HOURS SCHEDULED
Status: DISCONTINUED | OUTPATIENT
Start: 2025-06-05 | End: 2025-06-08 | Stop reason: HOSPADM

## 2025-06-05 RX ORDER — OXYTOCIN/0.9 % SODIUM CHLORIDE 30/500 ML
1-20 PLASTIC BAG, INJECTION (ML) INTRAVENOUS
Status: DISCONTINUED | OUTPATIENT
Start: 2025-06-05 | End: 2025-06-06

## 2025-06-05 RX ORDER — VALACYCLOVIR HYDROCHLORIDE 500 MG/1
500 TABLET, FILM COATED ORAL 2 TIMES DAILY
COMMUNITY

## 2025-06-05 RX ADMIN — BUTORPHANOL TARTRATE 2 MG: 2 INJECTION, SOLUTION INTRAMUSCULAR; INTRAVENOUS at 18:21

## 2025-06-05 RX ADMIN — VALACYCLOVIR 500 MG: 500 TABLET, FILM COATED ORAL at 21:42

## 2025-06-05 RX ADMIN — BUTORPHANOL TARTRATE 2 MG: 2 INJECTION, SOLUTION INTRAMUSCULAR; INTRAVENOUS at 21:24

## 2025-06-05 RX ADMIN — SODIUM CHLORIDE, POTASSIUM CHLORIDE, SODIUM LACTATE AND CALCIUM CHLORIDE 125 ML/HR: 600; 310; 30; 20 INJECTION, SOLUTION INTRAVENOUS at 18:14

## 2025-06-05 RX ADMIN — Medication 2 MILLI-UNITS/MIN: at 18:17

## 2025-06-05 NOTE — H&P
[facility]  Obstetric History and Physical    Chief Complaint   Patient presents with    Scheduled Induction       Subjective     Patient is a 39 y.o. female  currently at 39w2d, who presents with plan for induction of labor.    This pregnancy has been complicated by AMA, elevated alkaline phosphatase, history of HSV.  She was prescribed valtrex and reports no prodromal symptoms or lesions.  Her previous obstetric/gynecological history is notable for one prior       Prenatal Information:  Prenatal Results       Initial Prenatal Labs       Test Value Reference Range Date Time    Hemoglobin  11.8 g/dL 11.1 - 15.9 25 1144       13.5 g/dL 11.1 - 15.9 24 1115    Hematocrit  35.8 % 34.0 - 46.6 25 1144       40.9 % 34.0 - 46.6 24 1115    Platelets  201 x10E3/uL 150 - 450 25 1144       264 x10E3/uL 150 - 450 24 1115    Rubella IgG  2.13 index Immune >0.99 24 1115    Hepatitis B SAg  Negative  Negative 24 1115    Hepatitis C Ab        RPR        T. Pallidum Ab   Non-Reactive  Non-Reactive 25 1611       Non Reactive  Non Reactive 25 1211       Non Reactive  Non Reactive 24 1115    ABO  B   25 1611    Rh  Positive   25 1611    Antibody Screen  Negative  Negative 24 1115    HIV  Non Reactive  Non Reactive 24 1115    Urine Culture  Final report   24 1015    Gonorrhea  Negative  Negative 24 1000    Chlamydia  Negative  Negative 24 1000    TSH  1.150 uIU/mL 0.270 - 4.200 25 1219       1.420 uIU/mL 0.450 - 4.500 24 1115    HgB A1c   5.3 % 4.8 - 5.6 25 1144       5.5 % 4.8 - 5.6 24 1115    Varicella IgG  Non Reactive  Non Reactive 24 1115    Hemoglobinopathy Fractionation        Hemoglobinopathy (genetic testing)        Cystic fibrosis         Spinal muscular atrophy        Fragile X                  Fetal testing        Test Value Reference Range Date Time    NIPT        MSAFP         AFP-4                  2nd and 3rd Trimester       Test Value Reference Range Date Time    Hemoglobin (repeated)  10.7 g/dL 12.0 - 15.9 06/05/25 1611       10.8 g/dL 11.1 - 15.9 05/27/25 1215       10.6 g/dL 11.1 - 15.9 05/20/25 1305       10.5 g/dL 11.1 - 15.9 05/13/25 1239       11.5 g/dL 12.0 - 15.9 03/11/25 1211    Hematocrit (repeated)  33.7 % 34.0 - 46.6 06/05/25 1611       35.6 % 34.0 - 46.6 05/27/25 1215       35.1 % 34.0 - 46.6 05/20/25 1305       34.3 % 34.0 - 46.6 05/13/25 1239       34.9 % 34.0 - 46.6 03/11/25 1211    Platelets   230 10*3/mm3 140 - 450 06/05/25 1611       228 x10E3/uL 150 - 450 05/27/25 1215       236 x10E3/uL 150 - 450 05/20/25 1305       239 x10E3/uL 150 - 450 05/13/25 1239       248 10*3/mm3 140 - 450 03/11/25 1211       201 x10E3/uL 150 - 450 01/13/25 1144       264 x10E3/uL 150 - 450 11/12/24 1115    1 hour GTT   128 mg/dL 65 - 139 03/11/25 1211    Antibody Screen (repeated)  Negative   06/05/25 1611    3rd TM syphilis scrn (repeated)  RPR         3rd TM syphilis scrn (repeated) TP-Ab  Non-Reactive  Non-Reactive 06/05/25 1611       Non Reactive  Non Reactive 03/11/25 1211    3rd TM syphilis screen TB-Ab (FTA)  Non-Reactive  Non-Reactive 06/05/25 1611       Non Reactive  Non Reactive 03/11/25 1211    Syphilis cascade test TP-Ab (EIA)        Syphilis cascade TPPA        GTT Fasting        GTT 1 Hr        GTT 2 Hr        GTT 3 Hr        Group B Strep  Negative  Negative 05/13/25 1245              Other testing        Test Value Reference Range Date Time    Parvo IgG         CMV IgG                   Drug Screening       Test Value Reference Range Date Time    Amphetamine Screen  Negative ng/mL Nclzar=0935 11/12/24 1000    Barbiturate Screen  Negative ng/mL Dmtybx=107 11/12/24 1000    Benzodiazepine Screen  Negative ng/mL Gvuchx=970 11/12/24 1000    Methadone Screen  Negative ng/mL Gkexdv=439 11/12/24 1000    Phencyclidine Screen  Negative ng/mL Cutoff=25 11/12/24 1000    Opiates Screen   Negative ng/mL Jrafav=153 11/12/24 1000    THC Screen  Negative ng/mL Cutoff=50 11/12/24 1000    Cocaine Screen  Negative ng/mL Aczbyg=134 11/12/24 1000    Propoxyphene Screen  Negative ng/mL Nblhvb=876 11/12/24 1000    Buprenorphine Screen        Methamphetamine Screen        Oxycodone Screen        Tricyclic Antidepressants Screen                  Legend    ^: Historical                          External Prenatal Results       Pregnancy Outside Results - Transcribed From Office Records - See Scanned Records For Details       Test Value Date Time    ABO  B  06/05/25 1611    Rh  Positive  06/05/25 1611    Antibody Screen  Negative  06/05/25 1611       Negative  11/12/24 1115    Varicella IgG  Non Reactive  11/12/24 1115    Rubella  2.13 index 11/12/24 1115    Hgb  10.7 g/dL 06/05/25 1611       10.8 g/dL 05/27/25 1215       10.6 g/dL 05/20/25 1305       10.5 g/dL 05/13/25 1239       11.5 g/dL 03/11/25 1211       11.8 g/dL 01/13/25 1144       13.5 g/dL 11/12/24 1115    Hct  33.7 % 06/05/25 1611       35.6 % 05/27/25 1215       35.1 % 05/20/25 1305       34.3 % 05/13/25 1239       34.9 % 03/11/25 1211       35.8 % 01/13/25 1144       40.9 % 11/12/24 1115    HgB A1c   5.3 % 01/13/25 1144       5.5 % 11/12/24 1115    1h GTT  128 mg/dL 03/11/25 1211    3h GTT Fasting       3h GTT 1 hour       3h GTT 2 hour       3h GTT 3 hour        Gonorrhea (discrete)  Negative  11/12/24 1000    Chlamydia (discrete)  Negative  11/12/24 1000    RPR       Syphils cascade: TP-Ab (FTA)  Non-Reactive  06/05/25 1611       Non Reactive  03/11/25 1211       Non Reactive  11/12/24 1115    TP-Ab  Non-Reactive  06/05/25 1611       Non Reactive  03/11/25 1211       Non Reactive  11/12/24 1115    TP-Ab (EIA)       TPPA       HBsAg  Negative  11/12/24 1115    Herpes Simplex Virus PCR       Herpes Simplex VIrus Culture       HIV  Non Reactive  11/12/24 1115    Hep C RNA Quant PCR       Hep C Antibody       AFP       NIPT       Cystic Fibrosis  (Mike)       Cystic Fibroisis        Spinal Muscular atrophy       Fragile X       Group B Strep  Negative  25 1245    GBS Susceptibility to Clindamycin       GBS Susceptibility to Erythromycin       Fetal Fibronectin       Genetic Testing, Maternal Blood                 Drug Screening       Test Value Date Time    Urine Drug Screen       Amphetamine Screen  Negative ng/mL 24 1000    Barbiturate Screen  Negative ng/mL 24 1000    Benzodiazepine Screen  Negative ng/mL 24 1000    Methadone Screen  Negative ng/mL 24 1000    Phencyclidine Screen  Negative ng/mL 24 1000    Opiates Screen       THC Screen       Cocaine Screen       Propoxyphene Screen  Negative ng/mL 24 1000    Buprenorphine Screen       Methamphetamine Screen       Oxycodone Screen       Tricyclic Antidepressants Screen                 Legend    ^: Historical                             Past OB History:     OB History    Para Term  AB Living   4 1 1 0 2 1   SAB IAB Ectopic Molar Multiple Live Births   1 1 0 0 0 1      # Outcome Date GA Lbr [place]/2nd Weight Sex Type Anes PTL Lv   4 Current            3 IAB 17           2 SAB 10/26/15           1 Term 05   3685 g (8 lb 2 oz) M Vag-Spont   TIM       Past Medical History: Past Medical History:   Diagnosis Date    Abnormal Pap smear of cervix     Asthma     Herpes     Thyroid disease       Past Surgical History Past Surgical History:   Procedure Laterality Date    CERVICAL CONIZATION   W/ LASER      [place]    COLPOSCOPY      INDUCED       SKIN BIOPSY        Family History: Family History   Problem Relation Age of Onset    Heart attack Father     Heart disease Mother     Breast cancer Neg Hx     Ovarian cancer Neg Hx     Uterine cancer Neg Hx     Colon cancer Neg Hx       Social History:  reports that she has never smoked. She does not have any smokeless tobacco history on file.   reports no history of alcohol use.   reports no  history of drug use.         Review of Systems - Negative except per HPI for 10 point review of systems including General, Psychological, Ophthalmic, ENT, Endocrine, Respiratory, Cardiovascular, Gastrointestinal, Genito-Urinary, Musculoskeletal, Neurological, Dermatological      Objective       Vital Signs Range for the last 24 hours  Temperature: Temp:  [98.5 °F (36.9 °C)] 98.5 °F (36.9 °C)   Temp Source: Temp src: Oral   BP: BP: (110-123)/(69-78) 123/69   Pulse: Heart Rate:  [107-112] 107   Respirations: Resp:  [16] 16   SPO2:     O2 Amount (l/min):     O2 Devices     Weight: Weight:  [84.1 kg (185 lb 6.4 oz)] 84.1 kg (185 lb 6.4 oz)     Physical Examination: General appearance - alert, well appearing, and in no distress  Mental status - alert, oriented to person, place, and time  Eyes - sclera anicteric, left eye normal, right eye normal  Chest - no tachypnea, retractions or cyanosis  Heart - normal rate and regular rhythm  Abdomen - soft, nontender, gravid  Pelvic - nl ext genitalia, cervix 1-2/50/-3  Back exam - full range of motion, no tenderness, palpable spasm or pain on motion   Neurological - alert, oriented, normal speech, no focal findings or movement disorder noted   Musculoskeletal - no joint tenderness, deformity or swelling  Extremities - pedal edema trace  Skin - normal coloration and turgor, no rashes, no suspicious skin lesions noted    Presentation: Vertex   Cervix: Exam by: Method: sterile vaginal exam performed   Dilation: Cervical Dilation (cm): 1-2   Effacement: Cervical Effacement: 50   Station:         Fetal Heart Rate Assessment   Method: Fetal HR Assessment Method: external   Beats/min: Fetal HR (beats/min): 135   Baseline: Fetal HR Baseline: normal range   Varibility: Fetal HR Variability: moderate (amplitude range 6 to 25 bpm)   Accels: Fetal HR Accelerations: greater than/equal to 15 bpm, lasting at least 15 seconds   Decels: Fetal HR Decelerations: absent   Tracing Category:        Uterine Assessment   Method: Method: external tocotransducer   Frequency (min): Contraction Frequency (Minutes): 4-6   Ctx Count in 10 min:     Duration:     Intensity: Contraction Intensity: mild by palpation   Intensity by IUPC:     Resting Tone: Uterine Resting Tone: soft by palpation   Resting Tone by IUPC:     [place] Units:       Lab Results   Component Value Date    WBC 4.40 06/05/2025    HGB 10.7 (L) 06/05/2025    HCT 33.7 (L) 06/05/2025    MCV 79.9 06/05/2025     06/05/2025      Lab Results   Component Value Date    GLUCOSE 81 06/05/2025    BUN 3.0 (L) 06/05/2025    CREATININE 0.51 (L) 06/05/2025     06/05/2025    K 3.8 06/05/2025     (H) 06/05/2025    CALCIUM 9.0 06/05/2025    PROTEINTOT 6.6 06/05/2025    ALBUMIN 3.3 (L) 06/05/2025    ALT 9 06/05/2025    AST 19 06/05/2025    ALKPHOS 671 (H) 06/05/2025    BILITOT 0.3 06/05/2025    GLOB 3.3 06/05/2025    AGRATIO 1.0 06/05/2025    BCR 5.9 (L) 06/05/2025    ANIONGAP 13.0 06/05/2025    EGFR 122.7 06/05/2025      US: Intrauterine pregnancy at 37w0d  Normal Interval growth  EFW:46.1%ile, AC:60.7%ile  Estimated fetal weight:  2989 g  TOBI: 16.6 cm  Placental location: Posterior  Fetal position: Vertex  BPP 8/8    Assessment & Plan   Assessment:  1.  Intrauterine pregnancy at 39w2d weeks gestation with reactive, reassuring fetal status.    2.  IOL  3.  GBS status: Negative  4. Elevated alkaline phosphatase -671    Plan:  1. Reviewed options and patient agrees to Cook catheter. This was placed without difficulty, mom and baby tolerated well.  Will start pitocin as well.    2. Plan of care has been reviewed with patient and .  Risks, benefits of treatment plan have been discussed.  All questions have been answered.  3. Plan to repeat CMP Pospartum and trend to see if placental driven elevation      Anglea Shaw MD  6/5/2025  18:11 EDT

## 2025-06-06 PROBLEM — R74.8 ELEVATED ALKALINE PHOSPHATASE LEVEL: Status: ACTIVE | Noted: 2025-06-06

## 2025-06-06 PROBLEM — Z34.90 PREGNANCY: Status: RESOLVED | Noted: 2025-06-05 | Resolved: 2025-06-06

## 2025-06-06 LAB
ATMOSPHERIC PRESS: 750.4 MMHG
BASE EXCESS BLDCOV CALC-SCNC: -11.4 MMOL/L (ref -30–30)
BDY SITE: ABNORMAL
DEVICE COMMENT: ABNORMAL
HCO3 BLDCOV-SCNC: 17.7 MMOL/L
INHALED O2 CONCENTRATION: 21 %
MODALITY: ABNORMAL
PCO2 BLDCOV: 50.6 MM HG (ref 35–51.3)
PH BLDCOV: 7.15 PH UNITS (ref 7.26–7.4)
PO2 BLDCOV: 34.7 MM HG (ref 19–39)
SAO2 % BLDCOV: ABNORMAL %

## 2025-06-06 PROCEDURE — 88307 TISSUE EXAM BY PATHOLOGIST: CPT

## 2025-06-06 PROCEDURE — 25810000003 LACTATED RINGERS PER 1000 ML: Performed by: OBSTETRICS & GYNECOLOGY

## 2025-06-06 PROCEDURE — 25010000002 ROPIVACAINE PER 1 MG: Performed by: STUDENT IN AN ORGANIZED HEALTH CARE EDUCATION/TRAINING PROGRAM

## 2025-06-06 PROCEDURE — C1755 CATHETER, INTRASPINAL: HCPCS | Performed by: STUDENT IN AN ORGANIZED HEALTH CARE EDUCATION/TRAINING PROGRAM

## 2025-06-06 PROCEDURE — 82803 BLOOD GASES ANY COMBINATION: CPT

## 2025-06-06 PROCEDURE — 25010000002 LIDOCAINE-EPINEPHRINE (PF) 1.5 %-1:200000 SOLUTION: Performed by: STUDENT IN AN ORGANIZED HEALTH CARE EDUCATION/TRAINING PROGRAM

## 2025-06-06 PROCEDURE — 59410 OBSTETRICAL CARE: CPT | Performed by: OBSTETRICS & GYNECOLOGY

## 2025-06-06 PROCEDURE — 0KQM0ZZ REPAIR PERINEUM MUSCLE, OPEN APPROACH: ICD-10-PCS | Performed by: OBSTETRICS & GYNECOLOGY

## 2025-06-06 RX ORDER — OXYTOCIN/0.9 % SODIUM CHLORIDE 30/500 ML
250 PLASTIC BAG, INJECTION (ML) INTRAVENOUS CONTINUOUS
Status: ACTIVE | OUTPATIENT
Start: 2025-06-06 | End: 2025-06-06

## 2025-06-06 RX ORDER — HYDROXYZINE HYDROCHLORIDE 50 MG/1
50 TABLET, FILM COATED ORAL NIGHTLY PRN
Status: DISCONTINUED | OUTPATIENT
Start: 2025-06-06 | End: 2025-06-08 | Stop reason: HOSPADM

## 2025-06-06 RX ORDER — IBUPROFEN 600 MG/1
600 TABLET, FILM COATED ORAL EVERY 6 HOURS PRN
Status: DISCONTINUED | OUTPATIENT
Start: 2025-06-06 | End: 2025-06-08 | Stop reason: HOSPADM

## 2025-06-06 RX ORDER — PRENATAL VIT/IRON FUM/FOLIC AC 27MG-0.8MG
1 TABLET ORAL DAILY
Status: DISCONTINUED | OUTPATIENT
Start: 2025-06-06 | End: 2025-06-08 | Stop reason: HOSPADM

## 2025-06-06 RX ORDER — ROPIVACAINE HYDROCHLORIDE 2 MG/ML
INJECTION, SOLUTION EPIDURAL; INFILTRATION; PERINEURAL AS NEEDED
Status: DISCONTINUED | OUTPATIENT
Start: 2025-06-06 | End: 2025-06-06 | Stop reason: SURG

## 2025-06-06 RX ORDER — OXYTOCIN/0.9 % SODIUM CHLORIDE 30/500 ML
125 PLASTIC BAG, INJECTION (ML) INTRAVENOUS ONCE AS NEEDED
Status: COMPLETED | OUTPATIENT
Start: 2025-06-06 | End: 2025-06-06

## 2025-06-06 RX ORDER — TRAMADOL HYDROCHLORIDE 50 MG/1
50 TABLET ORAL EVERY 6 HOURS PRN
Status: DISCONTINUED | OUTPATIENT
Start: 2025-06-06 | End: 2025-06-08 | Stop reason: HOSPADM

## 2025-06-06 RX ORDER — TRANEXAMIC ACID 10 MG/ML
1000 INJECTION, SOLUTION INTRAVENOUS ONCE AS NEEDED
Status: DISCONTINUED | OUTPATIENT
Start: 2025-06-06 | End: 2025-06-08 | Stop reason: HOSPADM

## 2025-06-06 RX ORDER — METHYLERGONOVINE MALEATE 0.2 MG/ML
200 INJECTION INTRAVENOUS ONCE AS NEEDED
Status: DISCONTINUED | OUTPATIENT
Start: 2025-06-06 | End: 2025-06-06

## 2025-06-06 RX ORDER — DOCUSATE SODIUM 100 MG/1
100 CAPSULE, LIQUID FILLED ORAL 2 TIMES DAILY
Status: DISCONTINUED | OUTPATIENT
Start: 2025-06-06 | End: 2025-06-08 | Stop reason: HOSPADM

## 2025-06-06 RX ORDER — SODIUM CHLORIDE 0.9 % (FLUSH) 0.9 %
1-10 SYRINGE (ML) INJECTION AS NEEDED
Status: DISCONTINUED | OUTPATIENT
Start: 2025-06-06 | End: 2025-06-08 | Stop reason: HOSPADM

## 2025-06-06 RX ORDER — ERYTHROMYCIN 5 MG/G
OINTMENT OPHTHALMIC
Status: ACTIVE
Start: 2025-06-06 | End: 2025-06-06

## 2025-06-06 RX ORDER — LIDOCAINE HYDROCHLORIDE AND EPINEPHRINE 15; 5 MG/ML; UG/ML
INJECTION, SOLUTION EPIDURAL AS NEEDED
Status: DISCONTINUED | OUTPATIENT
Start: 2025-06-06 | End: 2025-06-06 | Stop reason: SURG

## 2025-06-06 RX ORDER — PHYTONADIONE 1 MG/.5ML
INJECTION, EMULSION INTRAMUSCULAR; INTRAVENOUS; SUBCUTANEOUS
Status: ACTIVE
Start: 2025-06-06 | End: 2025-06-06

## 2025-06-06 RX ORDER — HYDROCORTISONE 25 MG/G
1 CREAM TOPICAL AS NEEDED
Status: DISCONTINUED | OUTPATIENT
Start: 2025-06-06 | End: 2025-06-08 | Stop reason: HOSPADM

## 2025-06-06 RX ORDER — CARBOPROST TROMETHAMINE 250 UG/ML
250 INJECTION, SOLUTION INTRAMUSCULAR
Status: DISCONTINUED | OUTPATIENT
Start: 2025-06-06 | End: 2025-06-06

## 2025-06-06 RX ORDER — ACETAMINOPHEN 325 MG/1
650 TABLET ORAL EVERY 6 HOURS PRN
Status: DISCONTINUED | OUTPATIENT
Start: 2025-06-06 | End: 2025-06-08 | Stop reason: HOSPADM

## 2025-06-06 RX ORDER — OXYTOCIN/0.9 % SODIUM CHLORIDE 30/500 ML
999 PLASTIC BAG, INJECTION (ML) INTRAVENOUS ONCE
Status: DISCONTINUED | OUTPATIENT
Start: 2025-06-06 | End: 2025-06-06 | Stop reason: HOSPADM

## 2025-06-06 RX ORDER — BISACODYL 10 MG
10 SUPPOSITORY, RECTAL RECTAL DAILY PRN
Status: DISCONTINUED | OUTPATIENT
Start: 2025-06-07 | End: 2025-06-08 | Stop reason: HOSPADM

## 2025-06-06 RX ORDER — ONDANSETRON 4 MG/1
4 TABLET, ORALLY DISINTEGRATING ORAL EVERY 8 HOURS PRN
Status: DISCONTINUED | OUTPATIENT
Start: 2025-06-06 | End: 2025-06-08 | Stop reason: HOSPADM

## 2025-06-06 RX ORDER — MISOPROSTOL 200 UG/1
800 TABLET ORAL ONCE AS NEEDED
Status: DISCONTINUED | OUTPATIENT
Start: 2025-06-06 | End: 2025-06-06

## 2025-06-06 RX ORDER — CALCIUM CARBONATE 500 MG/1
2 TABLET, CHEWABLE ORAL 3 TIMES DAILY PRN
Status: DISCONTINUED | OUTPATIENT
Start: 2025-06-06 | End: 2025-06-08 | Stop reason: HOSPADM

## 2025-06-06 RX ADMIN — SODIUM CHLORIDE, POTASSIUM CHLORIDE, SODIUM LACTATE AND CALCIUM CHLORIDE 999 ML/HR: 600; 310; 30; 20 INJECTION, SOLUTION INTRAVENOUS at 00:19

## 2025-06-06 RX ADMIN — ACETAMINOPHEN 650 MG: 325 TABLET ORAL at 17:32

## 2025-06-06 RX ADMIN — ACETAMINOPHEN 650 MG: 325 TABLET ORAL at 10:14

## 2025-06-06 RX ADMIN — TRAMADOL HYDROCHLORIDE 50 MG: 50 TABLET, COATED ORAL at 21:23

## 2025-06-06 RX ADMIN — ROPIVACAINE HYDROCHLORIDE 5 ML: 2 INJECTION, SOLUTION EPIDURAL; INFILTRATION at 00:26

## 2025-06-06 RX ADMIN — Medication 125 ML/HR: at 09:06

## 2025-06-06 RX ADMIN — LIDOCAINE HYDROCHLORIDE AND EPINEPHRINE 3 ML: 15; 5 INJECTION, SOLUTION EPIDURAL at 00:23

## 2025-06-06 RX ADMIN — Medication 10 ML/HR: at 00:29

## 2025-06-06 RX ADMIN — IBUPROFEN 600 MG: 600 TABLET ORAL at 21:04

## 2025-06-06 RX ADMIN — DOCUSATE SODIUM 100 MG: 100 CAPSULE, LIQUID FILLED ORAL at 21:00

## 2025-06-06 RX ADMIN — EPHEDRINE SULFATE 5 MG: 50 INJECTION INTRAVENOUS at 02:54

## 2025-06-06 RX ADMIN — VALACYCLOVIR 500 MG: 500 TABLET, FILM COATED ORAL at 21:00

## 2025-06-06 RX ADMIN — ROPIVACAINE HYDROCHLORIDE 5 ML: 2 INJECTION, SOLUTION EPIDURAL; INFILTRATION at 00:29

## 2025-06-06 RX ADMIN — TRAMADOL HYDROCHLORIDE 50 MG: 50 TABLET, COATED ORAL at 11:17

## 2025-06-06 RX ADMIN — SODIUM CHLORIDE, POTASSIUM CHLORIDE, SODIUM LACTATE AND CALCIUM CHLORIDE 125 ML/HR: 600; 310; 30; 20 INJECTION, SOLUTION INTRAVENOUS at 02:05

## 2025-06-06 RX ADMIN — IBUPROFEN 600 MG: 600 TABLET ORAL at 09:21

## 2025-06-06 NOTE — L&D DELIVERY NOTE
[facility]   Vaginal Delivery Note    Patient Name: Jay Araujo  : 1985  MRN: [id number]    Date of Delivery: 2025    Diagnosis     Pre & Post-Delivery:  Intrauterine pregnancy at 39w3d  Labor status: Induced Onset of Labor    Elevated alkaline phosphatase level             Problem List    Transfer to Postpartum     Review the Delivery Report for details.     Delivery     Delivery: Vaginal, Spontaneous    YOB: 2024   Time of Birth:  Gestational Age 7:52 AM  39w3d     Anesthesia: Epidural    Delivering clinician: Angela Shaw   Forceps?   No   Vacuum? No    Shoulder dystocia present: Yes Shoulder Dystocia Details  Dystocia Present? Yes  Time head delivered: 2025  7:52 AM  Gentle attempt at traction, assisted by maternal expulsive forces: Yes  If no, why:    Anterior shoulder:    Time recognized: 2025  7:52 AM    Time help called: 2025  7:52 AM Called by:     Time provider arrived:   Provider who arrived:     Time NICU arrived:   NICU staff:     Time additional staff arrived: 2025  7:52 AM Additional staff:            Delivery narrative:    Preop diagnosis:  39 y.o.  year old  at 39w3d      IOL     Elevated alkaline phosphatase   Postop diagnosis: Same    Indications: Jay Araujo is a 39 y.o.  who presented at 39w2d with plan for induction of labor. Patient was given a Cook catheter and started on pitocin.  The Cook catheter came out and she was 6cm. She received an epidural for pain control.  She then progressed along a normal labor curve to complete dilation    Findings: Female infant, Apgar 6/8, Weight 3610g; second degree perineal lacerations noted and repaired; Placenta intact, to pathology    QBL:      Procedure:The cervix was noted to be completely dilated, completely effaced, and +3 station. Under continuous electronic fetal heart rate monitoring, the patient was encouraged to push. With good maternal effort she  delivered a viable female infant. The head presented in the OA presentation and restituted to maternal left. There was a single nuchal cord present which was reduced. The left anterior fetal shoulder was not easily delivered with gentle downward traction.  Eder and suprapubic pressure from the right was then applied.  This did not resolve the dystocia.  Power maneuver was then applied to the left anterior shoulder which then easily delivered under the pubic bone.  The posterior shoulder then delivered and the body with the request of maternal expulsive effort.  There was a large gush of fluid behind the baby with some terminal meconium.  The cord was clamped and cut immediately as the baby was not immediately vigorous. The baby was taken to the warmer. Gases and cord blood were collected.   The placenta then delivered spontaneously intact, and a three vessel cord was noted. Uterine massage and IV Pitocin were given until the fundus was firm. The cervix, vagina, perineum, and rectum were carefully inspected for lacerations and a second degree was noted. This was repaired in standard fashion with 3-0 vicryl. Counts for needles, sponges, laps and instruments were correct times two at the end of the delivery. There were no sponges left in the vagina. There were no major complications. Mother and baby were bonding well at the end of the delivery.            Infant     Findings: female infant     Infant observations: Weight: 3610 g (7 lb 15.3 oz)  Length: 20 in  Observations/Comments:  OLE Fernanda PANDA     Apgars: 6  @ 1 minute /    8  @ 5 minutes   Infant Name: Krystal     Placenta & Cord         Placenta delivered  Spontaneous at   6/6/2025  7:55 AM    Cord: 3 vessels present.   Nuchal Cord?  yes; Number of nuchal loops present:  1   Cord blood obtained: Yes   Cord gases obtained:  Yes   Cord gas results: Venous:    pH, Cord Venous   Date Value Ref Range Status   06/06/2025 7.152 (L) 7.260 - 7.400 pH Units Final      "Comment:     Serial Number: [id number]:  [id number]     Base Excess, Cord Venous   Date Value Ref Range Status   06/06/2025 -11.4 -30.0 - 30.0 mmol/L Final       Arterial:  No results found for: \"PHCART\", \"BECART\"     Repair     Episiotomy: None    No    Lacerations: Yes  Laceration Information  Laceration Repaired?   Perineal: 2nd Yes   Periurethral:       Labial:       Sulcus:       Vaginal:       Cervical:         Suture used for repair: 3-0 Vicryl  Laceration Length for 3rd or 4th degree lacerations: N/A   Estimated Blood Loss:       Quantitative Blood Loss:    QBL from VAG DEL: 347 (06/06/25 0826)   TOTAL BLOOD LOSS : 347 mL (6/5/2025  3:39 PM - 6/6/2025 12:54 PM)  Complications     none    Disposition     Mother to Mother Baby/Postpartum  in stable condition currently.  Baby to remains with mom  in stable condition currently.    Angela Shaw MD  06/06/25  12:54 EDT        "

## 2025-06-06 NOTE — ANESTHESIA PROCEDURE NOTES
Labor Epidural      Patient reassessed immediately prior to procedure    Patient location during procedure: OB  Start Time: 6/6/2025 12:20 AM  Stop Time: 6/6/2025 12:23 AM  Performed By  Anesthesiologist: Rusty Black MD  Preanesthetic Checklist  Completed: patient identified, IV checked, site marked, risks and benefits discussed, surgical consent, monitors and equipment checked, pre-op evaluation and timeout performed  Additional Notes  Labor epidural using Arrow kit, no heme/CSF aspirated, no paraesthesias.  Test dose with 3cc 1.5% lido with epi is negative.    Prep:  Pt Position:sitting  Sterile Tech:cap, gloves, mask and sterile barrier  Prep:chlorhexidine gluconate and isopropyl alcohol  Monitoring:blood pressure monitoring, continuous pulse oximetry and EKG  Epidural Block Procedure:  Approach:midline  Guidance:landmark technique and palpation technique  Location:L4-L5  Needle Type:Tuohy  Needle Gauge:17  Loss of Resistance Medium: air  Loss of Resistance: 5cm  Cath Depth at skin:10 cm  Paresthesia: none  Aspiration:negative  Test Dose:negative  Number of Attempts: 1  Post Assessment:  Dressing:occlusive dressing applied and secured with tape  Pt Tolerance:patient tolerated the procedure well with no apparent complications  Complications:no

## 2025-06-06 NOTE — ANESTHESIA PREPROCEDURE EVALUATION
Anesthesia Evaluation                  Airway   Mallampati: II  TM distance: >3 FB  Neck ROM: full  Dental - normal exam     Pulmonary    (+) asthma,  Cardiovascular         Neuro/Psych  GI/Hepatic/Renal/Endo    (+) obesity, thyroid problem     Musculoskeletal     Abdominal    Substance History      OB/GYN    (+) Pregnant        Other                    Anesthesia Plan    ASA 2     epidural     (39w3d    I have reviewed the patient's history with the patient and the chart, including all pertinent laboratory results and imaging. Risks of neuraxial anesthesia were discussed with patient including but not limited to: inadequate block, bleeding, infection, persistent numbness or weakness, nerve damage, painful dysesthesia and spinal headache.  )    Anesthetic plan, risks, benefits, and alternatives have been provided, discussed and informed consent has been obtained with: patient.    CODE STATUS:    Code Status (Patient has no pulse and is not breathing): CPR (Attempt to Resuscitate)  Medical Interventions (Patient has pulse or is breathing): Full Support  Level Of Support Discussed With: Patient

## 2025-06-06 NOTE — PLAN OF CARE
Problem: Adult Inpatient Plan of Care  Goal: Plan of Care Review  Outcome: Progressing  Flowsheets (Taken 6/6/2025 0729)  Progress: improving  Outcome Evaluation: Pt. admitted to L&D for elective IOL at 39 wks and 2 days. CRB placed at 1752 and came out at 2359. 2 doses of Stadol administered for pain control, but subsequently had epidural placed. Pain well controlled. Pitocin currently infusin at 8 milliunits. Pt. complete and pushing at this time. Pt. plans for vaginal delivery. VSS.  Plan of Care Reviewed With: patient  Goal: Patient-Specific Goal (Individualized)  Outcome: Progressing  Flowsheets (Taken 6/6/2025 0729)  Patient/Family-Specific Goals (Include Timeframe): Healthy mom and baby by discharge  Individualized Care Needs: Explain care prior to performing interventions.  needed  Anxieties, Fears or Concerns: None expressed  Goal: Absence of Hospital-Acquired Illness or Injury  Outcome: Progressing  Intervention: Identify and Manage Fall Risk  Recent Flowsheet Documentation  Taken 6/5/2025 2124 by Elaine Rodriguez RN  Safety Promotion/Fall Prevention: safety round/check completed  Goal: Optimal Comfort and Wellbeing  Outcome: Progressing  Intervention: Monitor Pain and Promote Comfort  Recent Flowsheet Documentation  Taken 6/6/2025 0007 by Elaine Rodriguez RN  Pain Management Interventions: (Epidural requested) other (see comments)  Taken 6/5/2025 2124 by Elaine Rodriguez RN  Pain Management Interventions: pain medication given  Intervention: Provide Person-Centered Care  Recent Flowsheet Documentation  Taken 6/5/2025 2124 by Elaine Rodriguez RN  Trust Relationship/Rapport:   care explained   choices provided   emotional support provided   empathic listening provided   questions answered   questions encouraged   reassurance provided   thoughts/feelings acknowledged  Goal: Readiness for Transition of Care  Outcome: Progressing     Problem: Skin Injury Risk Increased  Goal: Skin Health and  Integrity  Outcome: Progressing  Intervention: Optimize Skin Protection  Recent Flowsheet Documentation  Taken 2025 by Elaine Rodriguez RN  Activity Management: up ad leonard     Problem: Anesthesia/Analgesia, Neuraxial  Goal: Safe, Effective Infusion Delivery  Outcome: Progressing  Goal: Stable Patient-Fetal Status  Outcome: Progressing  Goal: Absence of Infection Signs and Symptoms  Outcome: Progressing  Goal: Nausea and Vomiting Relief  Outcome: Progressing  Goal: Optimal Pain Control and Function  Outcome: Progressing  Intervention: Prevent or Manage Pain  Recent Flowsheet Documentation  Taken 2025 by Elaine Rodriguez RN  Pain Management Interventions: (Epidural requested) other (see comments)  Taken 2025 by Elaine Rodriguez RN  Pain Management Interventions: pain medication given  Goal: Effective Oxygenation and Ventilation  Outcome: Progressing  Goal: Baseline Motor Function Return  Outcome: Progressing  Intervention: Optimize Sensorimotor Function and Safety  Recent Flowsheet Documentation  Taken 2025 by Elaine Rodriguez RN  Safety Promotion/Fall Prevention: safety round/check completed  Goal: Effective Urinary Elimination  Outcome: Progressing     Problem: Labor  Goal: Hemostasis  Outcome: Progressing  Goal: Stable Fetal Wellbeing  Outcome: Progressing  Goal: Effective Progression to Delivery  Outcome: Progressing  Goal: Absence of Infection Signs and Symptoms  Outcome: Progressing  Goal: Acceptable Pain Control  Outcome: Progressing  Goal: Normal Uterine Contraction Pattern  Outcome: Progressing     Problem:  Fall Injury Risk  Goal: Absence of Fall, Infant Drop and Related Injury  Outcome: Progressing  Intervention: Promote Injury-Free Environment  Recent Flowsheet Documentation  Taken 2025 by Elaine Rodriguez RN  Safety Promotion/Fall Prevention: safety round/check completed   Goal Outcome Evaluation:  Plan of Care Reviewed With: patient        Progress:  improving  Outcome Evaluation: Pt. admitted to L&D for elective IOL at 39 wks and 2 days. CRB placed and came out at 2359. 2 doses of Stadol administered for pain control, but subsequently had epidural placed. Pain well controlled. Pitocin currently infusin at 8 milliunits. Pt. complete and pushing at this time. Pt. plans for vaginal delivery. VSS.

## 2025-06-06 NOTE — PLAN OF CARE
Goal Outcome Evaluation:           Progress: improving  Outcome Evaluation:  of baby girl @ 0752 with shoulder dystocia. apgars 6 and 8. baby with mom and recovery. patient to be transferred to [facility] at 1000

## 2025-06-06 NOTE — ANESTHESIA POSTPROCEDURE EVALUATION
Patient: Jay Araujo    Procedure Summary       Date: 06/06/25 Room / Location:     Anesthesia Start: 0015 Anesthesia Stop: 0752    Procedure: LABOR ANALGESIA Diagnosis:     Scheduled Providers:  Provider: Rusty Black MD    Anesthesia Type: epidural ASA Status: 2            Anesthesia Type: epidural    Vitals  Vitals Value Taken Time   /51 06/06/25 08:31   Temp 36.9 °C (98.5 °F) 06/06/25 05:00   Pulse 123 06/06/25 08:31   Resp 18 06/06/25 08:30   SpO2 100 % 06/06/25 08:30   Vitals shown include unfiled device data.        Post Anesthesia Care and Evaluation      Comments: Anesthesia present throughout labor and delivery

## 2025-06-06 NOTE — PROGRESS NOTES
Labor Progress Note    SUBJECTIVE:   Patient comfortable with epidural. Feels tired    OBJECTIVE:   Vitals:    25 0151 25 0156 25 0201 25 0206   BP:    103/52   Pulse: 83 99 97 86   Resp:       Temp:       TempSrc:       SpO2: 98% 96% 97% 96%   Weight:       Height:          Physical Examination:   General appearance - alert, well appearing, and in no distress  Chest - no tachypnea, retractions or cyanosis   Abdomen - gravid, nontender  Pelvic - normal external genitalia    FHT  normal baseline, mod variability, + accelerations, no decelerations  [place]: 2-3ctx/10 min    ASSESSMENT:   39 y.o.  at 39w3d here for IOL    PLAN:    Labor:  S/p cook catheter, on pitocin currently at 8mU/min  AROM this check with patient consent. Mom and baby tolerated well. Cervix 6/60/-1    Pain control adequate with epidural    Fetal status category 1     GBS Negative

## 2025-06-07 LAB
ALBUMIN SERPL-MCNC: 2.7 G/DL (ref 3.5–5.2)
ALBUMIN/GLOB SERPL: 1 G/DL
ALP SERPL-CCNC: 422 U/L (ref 39–117)
ALT SERPL W P-5'-P-CCNC: 9 U/L (ref 1–33)
ANION GAP SERPL CALCULATED.3IONS-SCNC: 7.6 MMOL/L (ref 5–15)
AST SERPL-CCNC: 20 U/L (ref 1–32)
BASOPHILS # BLD AUTO: 0.03 10*3/MM3 (ref 0–0.2)
BASOPHILS NFR BLD AUTO: 0.3 % (ref 0–1.5)
BILIRUB SERPL-MCNC: 0.3 MG/DL (ref 0–1.2)
BUN SERPL-MCNC: 3 MG/DL (ref 6–20)
BUN/CREAT SERPL: 5.1 (ref 7–25)
CALCIUM SPEC-SCNC: 8.5 MG/DL (ref 8.6–10.5)
CHLORIDE SERPL-SCNC: 113 MMOL/L (ref 98–107)
CO2 SERPL-SCNC: 22.4 MMOL/L (ref 22–29)
CREAT SERPL-MCNC: 0.59 MG/DL (ref 0.57–1)
DEPRECATED RDW RBC AUTO: 39.5 FL (ref 37–54)
EGFRCR SERPLBLD CKD-EPI 2021: 118.5 ML/MIN/1.73
EOSINOPHIL # BLD AUTO: 0.08 10*3/MM3 (ref 0–0.4)
EOSINOPHIL NFR BLD AUTO: 0.8 % (ref 0.3–6.2)
ERYTHROCYTE [DISTWIDTH] IN BLOOD BY AUTOMATED COUNT: 13.8 % (ref 12.3–15.4)
GLOBULIN UR ELPH-MCNC: 2.6 GM/DL
GLUCOSE SERPL-MCNC: 55 MG/DL (ref 65–99)
HCT VFR BLD AUTO: 26.9 % (ref 34–46.6)
HGB BLD-MCNC: 8.5 G/DL (ref 12–15.9)
IMM GRANULOCYTES # BLD AUTO: 0.07 10*3/MM3 (ref 0–0.05)
IMM GRANULOCYTES NFR BLD AUTO: 0.7 % (ref 0–0.5)
LYMPHOCYTES # BLD AUTO: 2.42 10*3/MM3 (ref 0.7–3.1)
LYMPHOCYTES NFR BLD AUTO: 24.8 % (ref 19.6–45.3)
MCH RBC QN AUTO: 25.3 PG (ref 26.6–33)
MCHC RBC AUTO-ENTMCNC: 31.6 G/DL (ref 31.5–35.7)
MCV RBC AUTO: 80.1 FL (ref 79–97)
MONOCYTES # BLD AUTO: 0.77 10*3/MM3 (ref 0.1–0.9)
MONOCYTES NFR BLD AUTO: 7.9 % (ref 5–12)
NEUTROPHILS NFR BLD AUTO: 6.39 10*3/MM3 (ref 1.7–7)
NEUTROPHILS NFR BLD AUTO: 65.5 % (ref 42.7–76)
NRBC BLD AUTO-RTO: 0 /100 WBC (ref 0–0.2)
PLATELET # BLD AUTO: 203 10*3/MM3 (ref 140–450)
PMV BLD AUTO: 10.8 FL (ref 6–12)
POTASSIUM SERPL-SCNC: 3.8 MMOL/L (ref 3.5–5.2)
PROT SERPL-MCNC: 5.3 G/DL (ref 6–8.5)
RBC # BLD AUTO: 3.36 10*6/MM3 (ref 3.77–5.28)
SODIUM SERPL-SCNC: 143 MMOL/L (ref 136–145)
WBC NRBC COR # BLD AUTO: 9.76 10*3/MM3 (ref 3.4–10.8)

## 2025-06-07 PROCEDURE — 80053 COMPREHEN METABOLIC PANEL: CPT | Performed by: OBSTETRICS & GYNECOLOGY

## 2025-06-07 PROCEDURE — 85025 COMPLETE CBC W/AUTO DIFF WBC: CPT | Performed by: OBSTETRICS & GYNECOLOGY

## 2025-06-07 PROCEDURE — 0503F POSTPARTUM CARE VISIT: CPT | Performed by: OBSTETRICS & GYNECOLOGY

## 2025-06-07 RX ADMIN — TRAMADOL HYDROCHLORIDE 50 MG: 50 TABLET, COATED ORAL at 09:33

## 2025-06-07 RX ADMIN — DOCUSATE SODIUM 100 MG: 100 CAPSULE, LIQUID FILLED ORAL at 20:08

## 2025-06-07 RX ADMIN — IBUPROFEN 600 MG: 600 TABLET ORAL at 20:07

## 2025-06-07 RX ADMIN — TRAMADOL HYDROCHLORIDE 50 MG: 50 TABLET, COATED ORAL at 20:07

## 2025-06-07 RX ADMIN — VALACYCLOVIR 500 MG: 500 TABLET, FILM COATED ORAL at 08:50

## 2025-06-07 RX ADMIN — MAGNESIUM HYDROXIDE 10 ML: 2400 SUSPENSION ORAL at 09:31

## 2025-06-07 RX ADMIN — DOCUSATE SODIUM 100 MG: 100 CAPSULE, LIQUID FILLED ORAL at 08:50

## 2025-06-07 RX ADMIN — PRENATAL VITAMINS-IRON FUMARATE 27 MG IRON-FOLIC ACID 0.8 MG TABLET 1 TABLET: at 08:50

## 2025-06-07 RX ADMIN — VALACYCLOVIR 500 MG: 500 TABLET, FILM COATED ORAL at 20:08

## 2025-06-07 RX ADMIN — IBUPROFEN 600 MG: 600 TABLET ORAL at 09:33

## 2025-06-07 RX ADMIN — Medication: at 09:33

## 2025-06-07 NOTE — PROGRESS NOTES
"Subjective:    Cc:  Postpartum    Postpartum Day 1:     The patient feels well.  Pain is well controlled with current medications. The baby is well.  Urinary output is adequate. The patient is ambulating well. The patient is tolerating a normal diet. Flatus has been passed.      Objective:    Vital signs in last 24 hours:  Blood pressure 122/73, pulse 85, temperature 98.2 °F (36.8 °C), temperature source Oral, resp. rate 16, height 157.5 cm (62\"), weight 84.1 kg (185 lb 6.4 oz), last menstrual period 08/23/2024, SpO2 100%, currently breastfeeding.      General:    alert, appears stated age, and cooperative   Uterine Fundus:   firm     Labs    Hgb 10.7 --> 8.5  Alk Phos 671 --> 422  Rh + / Female infant.    Assessment/Plan:.     Postpartum Day #1  - Continue postpartum course.  Making normal postpartum milestones.  Discharge tomorrow.  Iron to be continued as outpatient for anemia.  - Alk Phos is decreasing after delivery.    Bryan Gomez MD     "

## 2025-06-08 VITALS
HEART RATE: 69 BPM | WEIGHT: 185.4 LBS | SYSTOLIC BLOOD PRESSURE: 118 MMHG | TEMPERATURE: 98.2 F | OXYGEN SATURATION: 100 % | HEIGHT: 62 IN | BODY MASS INDEX: 34.12 KG/M2 | DIASTOLIC BLOOD PRESSURE: 59 MMHG | RESPIRATION RATE: 16 BRPM

## 2025-06-08 PROCEDURE — 0503F POSTPARTUM CARE VISIT: CPT | Performed by: OBSTETRICS & GYNECOLOGY

## 2025-06-08 RX ORDER — ACETAMINOPHEN 325 MG/1
650 TABLET ORAL EVERY 6 HOURS PRN
Qty: 30 TABLET | Refills: 0 | Status: SHIPPED | OUTPATIENT
Start: 2025-06-08

## 2025-06-08 RX ORDER — IBUPROFEN 600 MG/1
600 TABLET, FILM COATED ORAL EVERY 6 HOURS PRN
Qty: 30 TABLET | Refills: 0 | Status: SHIPPED | OUTPATIENT
Start: 2025-06-08

## 2025-06-08 RX ORDER — TRAMADOL HYDROCHLORIDE 50 MG/1
50 TABLET ORAL EVERY 6 HOURS PRN
Qty: 9 TABLET | Refills: 0 | Status: SHIPPED | OUTPATIENT
Start: 2025-06-08

## 2025-06-08 RX ADMIN — IBUPROFEN 600 MG: 600 TABLET ORAL at 06:15

## 2025-06-08 RX ADMIN — VALACYCLOVIR 500 MG: 500 TABLET, FILM COATED ORAL at 08:12

## 2025-06-08 RX ADMIN — PRENATAL VITAMINS-IRON FUMARATE 27 MG IRON-FOLIC ACID 0.8 MG TABLET 1 TABLET: at 08:11

## 2025-06-08 RX ADMIN — ACETAMINOPHEN 650 MG: 325 TABLET ORAL at 06:14

## 2025-06-08 RX ADMIN — IBUPROFEN 600 MG: 600 TABLET ORAL at 16:25

## 2025-06-08 RX ADMIN — TRAMADOL HYDROCHLORIDE 50 MG: 50 TABLET, COATED ORAL at 08:15

## 2025-06-08 RX ADMIN — DOCUSATE SODIUM 100 MG: 100 CAPSULE, LIQUID FILLED ORAL at 08:12

## 2025-06-08 NOTE — PLAN OF CARE
Goal Outcome Evaluation:         Progressing well, voids without diff, pain controlledm minimal bleeding, bottle feeding

## 2025-06-08 NOTE — DISCHARGE SUMMARY
Date of Discharge:  2025    Discharge Diagnosis: Intrauterine pregnancy at 39-2/7 weeks, delivered    Presenting Problem/History of Present Illness  Pregnancy [Z34.90]       Hospital Course  Patient is a 39 y.o. female presented at 39-3/7 weeks for an induction of labor.  The patient had elevated alkaline phosphatase, but was not preeclamptic.  She progressed to spontaneous vaginal delivery of a vigorous .  For event surrounding delivery, please see the delivery note.  Postpartum course was smooth.  By postpartum day #1, alkaline phosphatase was decreasing.  By day 2, the patient had been afebrile throughout her course, lochia was minimal and pain was well-controlled.  At this point, the patient requested discharge home and I felt that she was stable for this.    Procedures Performed         Consults:   Consults       No orders found from 2025 to 2025.                Condition on Discharge: Stable    Vital Signs  Temp:  [98.2 °F (36.8 °C)] 98.2 °F (36.8 °C)  Heart Rate:  [69-72] 69  Resp:  [16] 16  BP: (118-126)/(59-78) 118/59    Physical Exam:   The abdomen is soft and nondistended.  Fundus is firm and below the umbilicus.  It is not tender to palpation.  Extremities are equal in size.    Discharge Disposition  Home or Self Care    Discharge Medications     Discharge Medications        New Medications        Instructions Start Date   acetaminophen 325 MG tablet  Commonly known as: TYLENOL   650 mg, Oral, Every 6 Hours PRN      ibuprofen 600 MG tablet  Commonly known as: ADVIL,MOTRIN   600 mg, Oral, Every 6 Hours PRN      traMADol 50 MG tablet  Commonly known as: ULTRAM   50 mg, Oral, Every 6 Hours PRN             Continue These Medications        Instructions Start Date   CitraNatal DHA 27-1 & 250 MG tablet   1 tablet, Oral, Daily      valACYclovir 500 MG tablet  Commonly known as: VALTREX   500 mg, 2 Times Daily             Stop These Medications      docusate sodium 100 MG capsule  Commonly  known as: Colace     prenatal vitamin 27-0.8 27-0.8 MG tablet tablet     vitamin B-6 50 MG tablet  Commonly known as: PYRIDOXINE              Discharge Diet:   Diet Instructions    Regular diet           Activity at Discharge:     Follow-up Appointments  Future Appointments   Date Time Provider Department Center   7/18/2025  2:00 PM Isabelle Mcgowan APRN [facility]     Additional Instructions for the Follow-ups that You Need to Schedule       Call MD With Problems / Concerns   As directed      Instructions: Call for fever, severe abdominal pain, heavy bleeding or any other concerns.    Order Comments: Instructions: Call for fever, severe abdominal pain, heavy bleeding or any other concerns.         Discharge Follow-up with Specified Provider: Dr Shaw; 6 Weeks   As directed      To: Dr Shaw   Follow Up: 6 Weeks                Test Results Pending at Discharge       Juan Shin MD  06/08/25  17:15 EDT

## 2025-06-18 ENCOUNTER — TELEPHONE (OUTPATIENT)
Dept: LABOR AND DELIVERY | Facility: HOSPITAL | Age: 40
End: 2025-06-18
Payer: COMMERCIAL

## 2025-06-18 ENCOUNTER — PATIENT OUTREACH (OUTPATIENT)
Dept: LABOR AND DELIVERY | Facility: HOSPITAL | Age: 40
End: 2025-06-18
Payer: COMMERCIAL

## 2025-06-18 NOTE — TELEPHONE ENCOUNTER
"During PP call with patient today her EPDS was 7 but she reports that she is \"feeling down\" and that she tried to message the office about this so that she could be seen sooner than July. I do not see a message from her but called the office and they scheduled her to see you on Friday. FYI, she is also very anxious about medical bills she is receiving. Per the 's office her Medicaid is active now but the bills are from May when her Medicaid was inactive. They are sending her the [place] financial assistance application and she is aware to watch for it in the mail and then submit the required documents. Just wanted to update you in case she asks about that on Friday.   "

## 2025-06-18 NOTE — OUTREACH NOTE
Motherhood Connection  Postpartum Check-In    Questions/Answers      Flowsheet Row Responses   Visit Setting Telephone   Best Method for Contacting Work   OB Discharge Note Reviewed  Reviewed   OB Discharge Medications Reviewed  Reviewed    discharged home with mother? Yes   Current Pain Levels 0-10 3   At Rest Pain Levels 0-10 3   Pain level with activity 0-10 3   Acceptable Pain Level 0-10 3   Pain Location Perineum   Pain Description Sore   Verbalized Emotional State Acceptance   Family/Support Network Spouse   Level of Involvement in Care Supportive, Interactive, Attentive   Do you feel comfortable in your relationship with your baby? Yes   Have members of your household adjusted to your baby? Yes   Is the baby's father supportive and/or involved with the baby? Yes   How does your partner feel about the baby? Happy, Involved   Do you feel safe at home, school and work? Yes   Are you in a relationship with someone who threatens you or hurts you? No   Do you have the resources to keep yourself and your baby healthy and safe? Yes   Lochia (per patient report) Brown-eva Red   Amount Scant   Number of pads per day 5   Lochia Odor None   Is patient breastfeeding? No   How is breast suppression going? good   Postpartum Depression Screening Education Education Provided   Doctor Appointments: Education Provided   Postpartum Care Education Education Provided   S & S to report Education Provided   Followup Appointments Made Yes   Well Child Visit Appointments Made Yes   Appointment Date 25   Provider/Agency Colin   Well Child Checkup Provider Name [facility]   Well Child Check Up Date: 25   Did you complete the visit? Yes   Were there any specific concerns? No   Umbilical Cord No reported signs or symptoms   Feeding Readiness Cues: Crying, Eager   Infant Feeding Method Formula   Is a lactation referral indicated? No   Formula Type --  [similac 360]   Formula PO (mL) 3 oz   Formula/Expressed Milk  "frequency of feedings: every 3 hours   Number of wet diapers x 24 hours 9   Last BM x 24 hours 2   What safe sleep surface is available? Bassinet   Are there stuffed animals, toys, pillows, quilts, blankets, wedges, positioners, bumpers or other loose bedding in the infant's sleeping environment? No   Where does the baby usually sleep? Bassinet   Does the baby ever share a sleep surface with a sibling, adult or pet? No   Does the baby ever share a sleep surface in a bed, couch, recliner or other? No   What position do you place your baby to sleep for naps? Back   What position do you place your baby to sleep at night Back   Are you and/or other caregivers smoking inside or outside the baby's home? No   Is the infant dressed appropiately for the temperature of the home? Yes   Do you use a clean, dry pacifier that is not attached to a string or stuffed animal? Yes            Review of Systems    Most Recent [place]  Depression Scale Score (EPDS)    Performed by a clinician: 7 (2025  2:47 PM)    5 Ps Screen  Done      [id number]. She reports she is recovering well with minimal pain and scant bleeding. Infant is formula feeding and has [facility] appt. Seen at [facility] with no reported concerns. She reports good support from her . EPDS 7 but she does says that she is \"feeling sad\" and tried to message the office about this. I called office to get her appt scheduled for Fri. Sh is also worried about medical bills. Per the cashiers office these seem to be bills from May when her Medicaid was inactive. They are sending her an application for financial assistance in the mail. I called her back with  [phone] to review appt and financial assistance. Will send to RN call center.     Sil GUEVARA - RN  Maternity Nurse Navigator    2025, 15:26 EDT    "

## 2025-06-20 ENCOUNTER — POSTPARTUM VISIT (OUTPATIENT)
Dept: OBSTETRICS AND GYNECOLOGY | Facility: CLINIC | Age: 40
End: 2025-06-20
Payer: COMMERCIAL

## 2025-06-20 VITALS
HEIGHT: 62 IN | DIASTOLIC BLOOD PRESSURE: 88 MMHG | BODY MASS INDEX: 29.92 KG/M2 | SYSTOLIC BLOOD PRESSURE: 135 MMHG | WEIGHT: 162.6 LBS | HEART RATE: 61 BPM

## 2025-06-20 DIAGNOSIS — Z86.39 H/O: HYPOTHYROIDISM: ICD-10-CM

## 2025-06-20 DIAGNOSIS — R74.8 ELEVATED ALKALINE PHOSPHATASE LEVEL: ICD-10-CM

## 2025-06-20 DIAGNOSIS — O16.9 ELEVATED BLOOD PRESSURE AFFECTING PREGNANCY, ANTEPARTUM: Primary | ICD-10-CM

## 2025-06-20 NOTE — PROGRESS NOTES
Patient or patient representative verbalized consent for the use of Ambient Listening during the visit with  Angela Shaw MD for chart documentation. 2025  14:17 EDT    History of Present Illness  The patient is a 40-year-old [id number] who presents for follow-up of postpartum depression and elevated blood pressure. She had a vaginal delivery on 2025 at 39 weeks and 5 days, complicated by shoulder dystocia. Her antepartum course included elevated alkaline phosphatase, peaking at 671 and trending down to 422 on postpartum day one. However, her labs showed no signs of severe preeclampsia.    Postpartum Depression  - Persistent crying episodes, a symptom she did not experience after her previous deliveries  - History of intermittent depression and anxiety but has never sought treatment  - Episode of anxiety during her immigration process managed without medication    Elevated Blood Pressure  - Concern about elevated blood pressure readings  - Severe headaches for the past 3 days, most intense upon waking but have slightly improved  - Not taking any medication for headaches      Back Pain  - Experiencing back pain for the past 5 days  - Taking ibuprofen and Tylenol for pain management  - Took tramadol for internal pain but discontinued due to dizziness    Supplemental information: She is currently formula feeding her infant as the baby did not want to breastfeed and has not used a breast pump. She is able to sleep occasionally. She is inquiring about the need for repeat thyroid function tests.    MEDICATIONS  Current: Ibuprofen, Tylenol.  Discontinued: Tramadol.      Vitals:    25 1339   BP: 145/98   Pulse: 61       Physical Exam  Constitutional:       General: She is not in acute distress.     Appearance: She is well-developed. She is not diaphoretic.   HENT:      Head: Normocephalic and atraumatic.   Eyes:      General: No scleral icterus.     Extraocular Movements: Extraocular movements intact.    Pulmonary:      Effort: Pulmonary effort is normal. No respiratory distress.   Skin:     General: Skin is warm and dry.   Neurological:      General: No focal deficit present.      Mental Status: She is alert and oriented to person, place, and time.      Comments: No clonus 2+ patella reflexes bilaterally   Psychiatric:         Mood and Affect: Mood normal.         Behavior: Behavior normal.         Thought Content: Thought content normal.         Judgment: Judgment normal.         Results  - Laboratory Studies:    - Alkaline phosphatase: peaked at 671, trended down to 422 on postpartum day one     Diagnosis Plan   1. Elevated blood pressure affecting pregnancy, antepartum  CBC (No Diff)    Comprehensive Metabolic Panel    Protein / Creatinine Ratio, Urine - Urine, Clean Catch    TSH Rfx On Abnormal To Free T4      2. H/O: hypothyroidism  TSH Rfx On Abnormal To Free T4      3. Postpartum care and examination            Assessment & Plan  1. Postpartum depression: Acute.  - Discussed common emotional instability in the postpartum period, including crying and anger.  - Offered medication safe for use while breastfeeding.  - Presented option for virtual counseling.  - Declined medication and counseling at this time.  - Advised to contact the office if symptoms worsen or if there is a change of mind especially with SI or HI which she denies at this time.      2. Elevated blood pressure: Acute.  - Blood pressure slightly elevated today.  - Experiencing headaches- has not yet taken any medication  - Advised to monitor blood pressure.  - Seek immediate medical attention if systolic reading exceeds 160 or diastolic reading surpasses 110, if pain persists despite taking ibuprofen or Tylenol, or if experiencing black spots in vision.  - At this time, her blood pressure is mildly elevated and repeat was normal.  I did discuss with the patient that given her headache she should consider going to the hospital for immediate  evaluation but she and her  feel that it is appropriate for them to get labs and she will go home and take a medication for this.  If not improved she will go to the hospital for immediate evaluation.  - Blood tests to be conducted today.  - Will be contacted if results indicate any abnormalities and advised to visit the hospital.    3. Thyroid management.  - Explained that thyroid levels can fluctuate in the first 6 months postpartum.  - Frequent monitoring required.  - Thyroid function test will be ordered.          Angela Shaw MD  06/20/25 14:17 EDT     Return in about 1 week (around 6/27/2025).

## 2025-06-21 LAB
ALBUMIN SERPL-MCNC: 4 G/DL (ref 3.5–5.2)
ALBUMIN/GLOB SERPL: 1.5 G/DL
ALP SERPL-CCNC: 266 U/L (ref 39–117)
ALT SERPL-CCNC: 11 U/L (ref 1–33)
AST SERPL-CCNC: 12 U/L (ref 1–32)
BILIRUB SERPL-MCNC: 0.3 MG/DL (ref 0–1.2)
BUN SERPL-MCNC: 6 MG/DL (ref 6–20)
BUN/CREAT SERPL: 10 (ref 7–25)
CALCIUM SERPL-MCNC: 9.2 MG/DL (ref 8.6–10.5)
CHLORIDE SERPL-SCNC: 106 MMOL/L (ref 98–107)
CO2 SERPL-SCNC: 23.4 MMOL/L (ref 22–29)
CREAT SERPL-MCNC: 0.6 MG/DL (ref 0.57–1)
CREAT UR-MCNC: 49.9 MG/DL
EGFRCR SERPLBLD CKD-EPI 2021: 117.3 ML/MIN/1.73
ERYTHROCYTE [DISTWIDTH] IN BLOOD BY AUTOMATED COUNT: 14.2 % (ref 12.3–15.4)
GLOBULIN SER CALC-MCNC: 2.7 GM/DL
GLUCOSE SERPL-MCNC: 71 MG/DL (ref 65–99)
HCT VFR BLD AUTO: 36.5 % (ref 34–46.6)
HGB BLD-MCNC: 10.6 G/DL (ref 12–15.9)
MCH RBC QN AUTO: 23.9 PG (ref 26.6–33)
MCHC RBC AUTO-ENTMCNC: 29 G/DL (ref 31.5–35.7)
MCV RBC AUTO: 82.4 FL (ref 79–97)
PLATELET # BLD AUTO: 381 10*3/MM3 (ref 140–450)
POTASSIUM SERPL-SCNC: 4.2 MMOL/L (ref 3.5–5.2)
PROT SERPL-MCNC: 6.7 G/DL (ref 6–8.5)
PROT UR-MCNC: 7.5 MG/DL
PROT/CREAT UR: 150 MG/G CREAT (ref 0–200)
RBC # BLD AUTO: 4.43 10*6/MM3 (ref 3.77–5.28)
SODIUM SERPL-SCNC: 141 MMOL/L (ref 136–145)
TSH SERPL DL<=0.005 MIU/L-ACNC: 1.16 UIU/ML (ref 0.27–4.2)
WBC # BLD AUTO: 3.43 10*3/MM3 (ref 3.4–10.8)

## 2025-06-25 ENCOUNTER — PATIENT OUTREACH (OUTPATIENT)
Dept: CALL CENTER | Facility: HOSPITAL | Age: 40
End: 2025-06-25
Payer: COMMERCIAL

## 2025-06-25 NOTE — OUTREACH NOTE
Motherhood Connection Survey      Flowsheet Row Responses   [facility]   [facility] attempt successful? Yes  [pacific interpreters # [id number]]   Call start time    Call end time    Baby sex Girl    discharged home with mother? Yes   Baby sex Girl   Delivery type Vaginal   Emotional state Acceptance   Family support Yes   Do you have all necessary resources to care for you and your baby?  Yes   Have members of your household adjusted to your baby? Yes   Additional comments waiting for WIC   Did you have any problems with pre-eclampsia during this pregnancy? No   Do you have any of the following: Other, No Issues   Did you have blood glucose issues during this pregnancy No   Lochia amount Light   Lochia per patient report Rubra   Did you have an episiotomy/tear/abdominal incision? No   Feeding Method Bottle   Frequency 3 hours   Amount 3 1/2- 4   Breast Condition No   Nipple Condition No   Nursing Interventions Supportive bra   Signs baby is ready to eat Rooting   Feeding tolerance Wet/dirty diapers   Number of wet diapers x 24 hours 10   Last BM x 24 hours 2   Umbilical Cord No reported signs or symptoms   Where does the baby usually sleep? Bassinet   Are there stuffed animals, toys, pillows, quilts, blankets, wedges, positioners, bumpers or other loose bedding in the infant's sleeping environment? No   Does the baby ever share a sleep surface in a bed, couch, recliner or other? No   What position do you lay your baby down to sleep? Back   Are you and/or other caregivers smoking inside or outside the baby's home? No   Mom appointment comments: yes mom has had an appt.   Baby appointment comments: has had 2 appts   Call completed? Yes   How satisfied were you with the Motherhood Connection Program? 5   Anyone you would like to recognize from your time in the Motherhood Connection Program Sil HERNANDEZ - Registered Nurse

## 2025-06-26 ENCOUNTER — POSTPARTUM VISIT (OUTPATIENT)
Dept: OBSTETRICS AND GYNECOLOGY | Facility: CLINIC | Age: 40
End: 2025-06-26
Payer: COMMERCIAL

## 2025-06-26 VITALS
BODY MASS INDEX: 29.74 KG/M2 | WEIGHT: 161.6 LBS | SYSTOLIC BLOOD PRESSURE: 149 MMHG | HEIGHT: 62 IN | HEART RATE: 57 BPM | DIASTOLIC BLOOD PRESSURE: 85 MMHG

## 2025-06-26 DIAGNOSIS — O16.9 ELEVATED BLOOD PRESSURE AFFECTING PREGNANCY, ANTEPARTUM: Primary | ICD-10-CM

## 2025-06-26 DIAGNOSIS — G56.03 BILATERAL CARPAL TUNNEL SYNDROME: ICD-10-CM

## 2025-06-26 DIAGNOSIS — Z30.09 STERILIZATION CONSULT: ICD-10-CM

## 2025-06-26 PROCEDURE — 99213 OFFICE O/P EST LOW 20 MIN: CPT | Performed by: OBSTETRICS & GYNECOLOGY

## 2025-06-26 RX ORDER — NIFEDIPINE 30 MG/1
30 TABLET, EXTENDED RELEASE ORAL DAILY
Qty: 30 TABLET | Refills: 2 | Status: SHIPPED | OUTPATIENT
Start: 2025-06-26

## 2025-06-26 RX ORDER — SODIUM CHLORIDE 0.9 % (FLUSH) 0.9 %
3 SYRINGE (ML) INJECTION EVERY 12 HOURS SCHEDULED
OUTPATIENT
Start: 2025-06-26

## 2025-06-26 RX ORDER — SODIUM CHLORIDE 0.9 % (FLUSH) 0.9 %
10 SYRINGE (ML) INJECTION AS NEEDED
OUTPATIENT
Start: 2025-06-26

## 2025-06-26 RX ORDER — SODIUM CHLORIDE 9 MG/ML
40 INJECTION, SOLUTION INTRAVENOUS AS NEEDED
OUTPATIENT
Start: 2025-06-26

## 2025-06-26 RX ORDER — ACETAMINOPHEN AND CODEINE PHOSPHATE 120; 12 MG/5ML; MG/5ML
1 SOLUTION ORAL DAILY
Qty: 84 TABLET | Refills: 4 | Status: SHIPPED | OUTPATIENT
Start: 2025-06-26 | End: 2026-06-26

## 2025-06-26 NOTE — PROGRESS NOTES
Patient or patient representative verbalized consent for the use of Ambient Listening during the visit with  Angela Shaw MD for chart documentation. 6/26/2025  15:20 EDT    History of Present Illness  The patient is status post a spontaneous vaginal delivery on 06/06/2025 and is here for a follow-up blood pressure check. She was seen last week with a headache and initially mild range blood pressure.    Headache and Blood Pressure  - She continues to experience headaches, which she describes as bothersome rather than severe.  - Her blood pressure remains elevated.  - She has been managing her pain with Tylenol and occasional ibuprofen, both of which she finds effective.  - She expresses discomfort in taking these medications on an empty stomach.    Mood and Vaginal Discharge  - She reports an improvement in her mood.  - She has been experiencing vaginal discharge, which she describes as small pieces of tissue.  - She has not experienced any fevers or itching.    Back Pain  - She notes persistent back pain, which she characterizes as more severe than usual.    Hand Cramping  - She reports persistent cramping in her hand, extending to all her fingers.  - The cramping has not shown any signs of improvement.    Supplemental information: She is considering tubal ligation and is seeking advice on whether she should abstain from sexual activity until the procedure is completed.    MEDICATIONS  Tylenol, ibuprofen      Vitals:    06/26/25 1500   BP: 149/85   Pulse: 57       Physical Exam  Constitutional:       General: She is not in acute distress.     Appearance: She is well-developed. She is not diaphoretic.   HENT:      Head: Normocephalic and atraumatic.   Eyes:      General: No scleral icterus.     Extraocular Movements: Extraocular movements intact.   Pulmonary:      Effort: Pulmonary effort is normal. No respiratory distress.   Skin:     General: Skin is warm and dry.   Neurological:      General: No focal deficit  present.      Mental Status: She is alert and oriented to person, place, and time.   Psychiatric:         Mood and Affect: Mood normal.         Behavior: Behavior normal.         Thought Content: Thought content normal.         Judgment: Judgment normal.           Results       Diagnosis Plan   1. Elevated blood pressure affecting pregnancy, antepartum        2. Sterilization consult  Case Request    CBC and Differential    sodium chloride 0.9 % flush 3 mL    sodium chloride 0.9 % flush 10 mL    sodium chloride 0.9 % infusion 40 mL    Case Request      3. Bilateral carpal tunnel syndrome  Ambulatory Referral to Hand Surgery          Assessment & Plan  1. Postpartum status: Stable.  - Vaginal discharge consisting of small clots and tissue, which is normal up to 3 to 6 weeks postpartum.  - No fever or itching reported.  - Contact the clinic if symptoms worsen.    2. Elevated blood pressure: Stable.  - Blood pressure remains elevated but not exceeding 160/110.  - Continues to experience bothersome but not severe headaches.  - Prescribe medication to lower blood pressure.  - Discontinue medication if significant dizziness occurs.  - Monitor blood pressure with a follow-up appointment with the nurse next week.    3. Hand cramps: Chronic.  - Persistent cramping in hands and fingers.  - Aware this likely resolves on its own but would like referral to an orthopedic specialist.  - Cancel referral if condition improves in the coming weeks.    4. Contraception.  - Abstain from sexual activity until the uterus returns to normal size, typically within 6 to 8 weeks postpartum.  - Prescribe progesterone-only pill for contraception.  - Place orders for tubal ligation.  - Schedule procedure for August or September 2025.    Follow-up  - Follow up in 3 weeks.    PROCEDURE    The patient had a spontaneous vaginal delivery on 06/06/2025.      Angela Shaw MD  06/26/25 15:20 EDT     Return in about 1 week (around 7/3/2025) for BP check  with nurse, 2-3 weeks for postpartum visit.

## 2025-06-27 DIAGNOSIS — Z3A.39 39 WEEKS GESTATION OF PREGNANCY: ICD-10-CM

## 2025-06-27 RX ORDER — TRAMADOL HYDROCHLORIDE 50 MG/1
50 TABLET ORAL EVERY 6 HOURS PRN
Qty: 9 TABLET | Refills: 0 | OUTPATIENT
Start: 2025-06-27

## 2025-06-27 RX ORDER — IBUPROFEN 600 MG/1
600 TABLET, FILM COATED ORAL EVERY 6 HOURS PRN
Qty: 30 TABLET | Refills: 0 | Status: SHIPPED | OUTPATIENT
Start: 2025-06-27

## 2025-06-27 NOTE — TELEPHONE ENCOUNTER
Refill for ibuprofen sent. Typically do not refill tramadol as this is a controlled substance.  She may alternate an NSAID and Tylenol (acetaminophen).  An example of this regimen is taking Ibuprofen 600mg every 8 hours and acetaminophen 500mg every 6 hours.  She can stagger the medication where you can take one or the other every 3 hours.  For example, Ibuprofen at 8AM, Tylenol at 11AM, Ibuprofen at 2PM, Tylenol at 5PM, etc

## 2025-07-10 ENCOUNTER — TELEPHONE (OUTPATIENT)
Dept: OBSTETRICS AND GYNECOLOGY | Facility: CLINIC | Age: 40
End: 2025-07-10
Payer: COMMERCIAL

## 2025-07-18 ENCOUNTER — POSTPARTUM VISIT (OUTPATIENT)
Dept: OBSTETRICS AND GYNECOLOGY | Facility: CLINIC | Age: 40
End: 2025-07-18
Payer: COMMERCIAL

## 2025-07-18 VITALS
HEIGHT: 62 IN | WEIGHT: 159 LBS | BODY MASS INDEX: 29.26 KG/M2 | SYSTOLIC BLOOD PRESSURE: 134 MMHG | DIASTOLIC BLOOD PRESSURE: 81 MMHG

## 2025-07-18 DIAGNOSIS — Z12.4 SCREENING FOR CERVICAL CANCER: ICD-10-CM

## 2025-07-18 DIAGNOSIS — N63.0 BREAST MASS IN FEMALE: ICD-10-CM

## 2025-07-18 DIAGNOSIS — Z30.41 ENCOUNTER FOR SURVEILLANCE OF CONTRACEPTIVE PILLS: ICD-10-CM

## 2025-07-18 DIAGNOSIS — R10.2 VAGINAL PAIN: ICD-10-CM

## 2025-07-18 RX ORDER — ESTRADIOL 0.1 MG/G
2 CREAM VAGINAL DAILY
Qty: 42.5 G | Refills: 0 | Status: SHIPPED | OUTPATIENT
Start: 2025-07-18 | End: 2025-07-28

## 2025-07-18 NOTE — PROGRESS NOTES
Chief Complaint   Patient presents with    Postpartum Care     Pt here today for 6wks pp visit, vag del 2025,       SUBJECTIVE:   Jay Araujo is a 40 y.o.  who presents s/p  Spontaneous Vaginal Delivery on 25. Her pregnancy was complicated by AMA, elevated alkaline phosphatase, history of HSV. Her postpartum course was complicated by depression and elevated BP. She was started on procardia 30 mg XL daily. Reports compliance with this medication. She has not returned to intercourse since delivery. Bowel and bladder function have returned to normal. Reports mood has improved. EDPS 0 today    C/o feeling left breast lump for 1 week. There is pain when pressure is applied to the area. Denies changes in size since she first noticed. She was not breastfeeding. Denies nipple drainage.     C/o rectal pain since delivery. Having severe pain with BM. Denies rectal bleeding    C/o irregular bleeding since starting POP. She is planning permanent sterilization with Dr Shaw next month. She is bottle feeding     used for this visit    Postpartum Depression: Low Risk  (2025)    [place]  Depression Scale     Last EPDS Total Score: 0     Last EPDS Self Harm Result: Never   Recent Concern: Postpartum Depression - Medium Risk (2025)    [place]  Depression Scale     Last EPDS Total Score: 7     Last EPDS Self Harm Result: Never      Past Medical History:   Diagnosis Date    Abnormal Pap smear of cervix     Asthma     Herpes     Thyroid disease      Past Surgical History:   Procedure Laterality Date    CERVICAL CONIZATION   W/ LASER      Media    COLPOSCOPY      INDUCED       SKIN BIOPSY         Patient Active Problem List   Diagnosis    Herpes simplex type 2 (HSV-2) infection affecting pregnancy, antepartum    Pregnancy complicated by prior cervical conization, antepartum    Elevated alkaline phosphatase level    Sterilization consult     "    OBJECTIVE:   /81   Ht 157.5 cm (62\")   Wt 72.1 kg (159 lb)   LMP 08/23/2024 (Exact Date)   Breastfeeding No   BMI 29.08 kg/m²        Physical Exam  Constitutional:       General: She is not in acute distress.     Appearance: Normal appearance. She is not ill-appearing, toxic-appearing or diaphoretic.   Genitourinary:      Vulva, bladder and urethral meatus normal.      No lesions in the vagina.      Right Labia: No rash, tenderness, lesions, skin changes or Bartholin's cyst.     Left Labia: No tenderness, lesions, skin changes, Bartholin's cyst or rash.     No labial fusion noted.      No inguinal adenopathy present in the right or left side.     Vaginal bleeding (small) present.      No vaginal discharge, erythema, tenderness or ulceration.      No vaginal prolapse present.     No vaginal atrophy present.     Vaginal exam comments: Pain noted with gentle palpation of introitus .        Right Adnexa: not tender, not full, not palpable, no mass present and not absent.     Left Adnexa: not tender, not full, not palpable, no mass present and not absent.     No cervical motion tenderness, discharge, friability, lesion, polyp, nabothian cyst or eversion.      Uterus is not enlarged, fixed, tender, irregular or prolapsed.      No uterine mass detected.     No urethral tenderness or mass present.      Pelvic exam was performed with patient in the lithotomy position.   Rectum:      No external hemorrhoid.   Breasts:     Breasts are symmetrical.      Right: Present. Inverted nipple (not a change) present. No swelling, bleeding, mass, nipple discharge, skin change, tenderness or breast implant.      Left: Present. Mass (mobile, nontender mass at 12 o'clock marble size) present. No swelling, bleeding, inverted nipple, nipple discharge, skin change, tenderness or breast implant.   HENT:      Head: Normocephalic and atraumatic.   Eyes:      Pupils: Pupils are equal, round, and reactive to light.   Cardiovascular: "      Rate and Rhythm: Normal rate.   Pulmonary:      Effort: Pulmonary effort is normal.   Abdominal:      General: There is no distension.      Palpations: Abdomen is soft. There is no mass.      Tenderness: There is no abdominal tenderness. There is no guarding.      Hernia: No hernia is present. There is no hernia in the left inguinal area or right inguinal area.   Musculoskeletal:         General: Normal range of motion.      Cervical back: Normal range of motion and neck supple. No tenderness.   Lymphadenopathy:      Cervical: No cervical adenopathy.      Upper Body:      Right upper body: No supraclavicular, axillary or pectoral adenopathy.      Left upper body: No supraclavicular, axillary or pectoral adenopathy.      Lower Body: No right inguinal adenopathy. No left inguinal adenopathy.   Neurological:      General: No focal deficit present.      Mental Status: She is alert and oriented to person, place, and time.      Cranial Nerves: No cranial nerve deficit.   Skin:     General: Skin is warm and dry.   Psychiatric:         Mood and Affect: Mood normal.         Behavior: Behavior normal.         Thought Content: Thought content normal.         Judgment: Judgment normal.   Vitals and nursing note reviewed.       Lab Results   Component Value Date    WBC 3.43 2025    HGB 10.6 (L) 2025    HCT 36.5 2025    MCV 82.4 2025     2025        ASSESSMENT:  Diagnoses and all orders for this visit:    1. Postpartum follow-up (Primary)    2.  (spontaneous vaginal delivery)  -     estradiol (ESTRACE VAGINAL) 0.1 MG/GM vaginal cream; Insert 2 g into the vagina Daily for 10 days.  Dispense: 42.5 g; Refill: 0    3. Encounter for surveillance of contraceptive pills    4. Screening for cervical cancer  -     IGP, Apt HPV,rfx 16 / 18,45    5. Breast mass in female  -     Mammo Diagnostic Digital Tomosynthesis Bilateral With CAD; Future  -     US Breast Left Limited; Future    6. Vaginal  pain  -     estradiol (ESTRACE VAGINAL) 0.1 MG/GM vaginal cream; Insert 2 g into the vagina Daily for 10 days.  Dispense: 42.5 g; Refill: 0    7. Postpartum hypertension       PLAN:   Doing well postpartum  Baby is doing well  Contraception:continue POP until permanent sterilization with Dr Shaw. Advised BTB not uncommon with first 3 months POP use, enc to take at the same time each day  Pain noted at introitus with gentle palpation, discussed estrace X10 days to manage. Recommend awaiting for resolution before returning to   At this time, may resume normal activity including intercourse and lifting.  Reviewed normal precautions.  Reviewed last pap smear 2022 NIL, HPV negative. Discussed current vaginal bleeding can interfere with test results and may need to be recollected  Rectal pain: no hemorrhoids noted. Will refer to GI for further evaluation  Breast mass: mobile, nontender mass at 12 o'clock marble size. Will complete breast imaging to further evaluate   HTN: Continue procardia at this time. Recommend f/u with PCP in 3 months to continue to monitor/manage.  Recommended follow up for annual exam or sooner with problems  Return in about 1 year (around 7/18/2026) for Annual physical, With Dr Shaw.    I spent 45 minutes caring for Jay on this date of service. This time includes time spent by me in the following activities: preparing for the visit, reviewing tests, performing a medically appropriate examination and/or evaluation, counseling and educating the patient/family/caregiver, referring and communicating with other health care professionals, documenting information in the medical record, independently interpreting results and communicating that information with the patient/family/caregiver, care coordination, ordering medications, obtaining a separately obtained history, and reviewing a separately obtained history    ROSSI Lora  7/18/2025  14:52 EDT

## 2025-07-18 NOTE — Clinical Note
Pt was diagnosed with postpartum hypertension. She is on procardia 30 mg XL. She is not breastfeeding. I encouraged her to f/u with PCP in 3 months for ongoing management. Stacie

## 2025-07-22 LAB
CYTOLOGIST CVX/VAG CYTO: NORMAL
CYTOLOGY CVX/VAG DOC CYTO: NORMAL
CYTOLOGY CVX/VAG DOC THIN PREP: NORMAL
DX ICD CODE: NORMAL
HPV I/H RISK 4 DNA CVX QL PROBE+SIG AMP: NEGATIVE
OTHER STN SPEC: NORMAL
SERVICE CMNT-IMP: NORMAL
STAT OF ADQ CVX/VAG CYTO-IMP: NORMAL

## 2025-07-25 ENCOUNTER — PRE-ADMISSION TESTING (OUTPATIENT)
Dept: PREADMISSION TESTING | Facility: HOSPITAL | Age: 40
End: 2025-07-25
Payer: COMMERCIAL

## 2025-07-25 VITALS
DIASTOLIC BLOOD PRESSURE: 81 MMHG | RESPIRATION RATE: 16 BRPM | BODY MASS INDEX: 29.66 KG/M2 | WEIGHT: 161.2 LBS | TEMPERATURE: 98.3 F | HEIGHT: 62 IN | HEART RATE: 82 BPM | OXYGEN SATURATION: 100 % | SYSTOLIC BLOOD PRESSURE: 126 MMHG

## 2025-07-25 LAB
ANION GAP SERPL CALCULATED.3IONS-SCNC: 10 MMOL/L (ref 5–15)
B-HCG UR QL: NEGATIVE
BUN SERPL-MCNC: 6 MG/DL (ref 6–20)
BUN/CREAT SERPL: 8.1 (ref 7–25)
CALCIUM SPEC-SCNC: 9.1 MG/DL (ref 8.6–10.5)
CHLORIDE SERPL-SCNC: 106 MMOL/L (ref 98–107)
CO2 SERPL-SCNC: 23 MMOL/L (ref 22–29)
CREAT SERPL-MCNC: 0.74 MG/DL (ref 0.57–1)
EGFRCR SERPLBLD CKD-EPI 2021: 105.7 ML/MIN/1.73
GLUCOSE SERPL-MCNC: 79 MG/DL (ref 65–99)
POTASSIUM SERPL-SCNC: 4.1 MMOL/L (ref 3.5–5.2)
SODIUM SERPL-SCNC: 139 MMOL/L (ref 136–145)

## 2025-07-25 PROCEDURE — 81025 URINE PREGNANCY TEST: CPT | Performed by: OBSTETRICS & GYNECOLOGY

## 2025-07-25 PROCEDURE — 80048 BASIC METABOLIC PNL TOTAL CA: CPT

## 2025-07-25 PROCEDURE — 85025 COMPLETE CBC W/AUTO DIFF WBC: CPT | Performed by: OBSTETRICS & GYNECOLOGY

## 2025-07-25 PROCEDURE — 36415 COLL VENOUS BLD VENIPUNCTURE: CPT

## 2025-07-25 NOTE — DISCHARGE INSTRUCTIONS
Take the following medications the morning of surgery:  NIFEDIPINE      If you are on an Aspirin or a Blood Thinner please clarify with the surgeon and prescribing physician if and when you are to hold the medication or if you are to continue the medication.  If you are on prescription narcotic pain medication to control your pain you may also take that medication the morning of surgery.      General Instructions:     Do not eat solid food after midnight the night before surgery.  Clear liquids day of surgery are allowed but must be stopped at least two hours before your hospital arrival time.       Allowed clear liquids      Water, sodas, and tea or coffee with no cream or milk added.       12 to 20 ounces of a clear liquid that contains carbohydrates is recommended.  If non-diabetic, have Gatorade or Powerade.  If diabetic, have G2 or Powerade Zero.     Do not have liquids red in color.  Do not consume chicken, beef, pork or vegetable broth or bouillon cubes of any variety as they are not considered clear liquids and are not allowed.      Infants may have breast milk up to four hours before surgery.  Infants drinking formula may drink formula up to six hours before surgery.   Patients who avoid smoking, chewing tobacco and alcohol for 4 weeks prior to surgery have a reduced risk of post-operative complications.  Quit smoking as many days before surgery as you can.  Do not smoke, use chewing tobacco or drink alcohol the day of surgery.   If applicable bring your C-PAP/ BI-PAP machine in with you to preop day of surgery.  Bring any papers given to you in the doctor’s office.  Wear clean comfortable clothes.  Do not wear contact lenses, false eyelashes or make-up.  Bring a case for your glasses.   Bring crutches or walker if applicable.  Remove all piercings.  Leave jewelry and any other valuables at home.  Hair extensions with metal clips must be removed prior to surgery.  The Pre-Admission Testing nurse will  instruct you to bring medications if unable to obtain an accurate list in Pre-Admission Testing.    Day of surgery you will need to let the preoperative nurse know the last time you took each of your medications.  To ensure a safe environment for patients and staff, we kindly ask that children under the age of 16 not accompany patients.  If you must bring a dependent child or dependent adult please ensure a responsible adult, other than yourself, is present to supervise them.      If you were given a blood bank ID arm band remember to bring it with you the day of surgery.    Preventing a Surgical Site Infection:  For 2 to 3 days before surgery, avoid shaving with a razor because the razor can irritate skin and make it easier to develop an infection.    Any areas of open skin can increase the risk of a post-operative wound infection by allowing bacteria to enter and travel throughout the body.  Notify your surgeon if you have any skin wounds / rashes even if it is not near the expected surgical site.  The area will need assessed to determine if surgery should be delayed until it is healed.  The night prior to surgery shower using a fresh bar of anti-bacterial soap (such as Dial) and clean washcloth.  Sleep in a clean bed with clean clothing.  Do not allow pets to sleep with you.  Shower on the morning of surgery using a fresh bar of anti-bacterial soap (such as Dial) and clean washcloth.  Dry with a clean towel and dress in clean clothing.  Ask your surgeon if you will be receiving antibiotics prior to surgery.  Make sure you, your family, and all healthcare providers clean their hands with soap and water or an alcohol based hand  before caring for you or your wound.    Day of surgery:  Your arrival time is approximately two hours before your scheduled surgery time.  Please note if you have an early arrival time the surgery doors do not open before 5:00 AM.  Upon arrival, a Pre-op nurse and Anesthesiologist  will review your health history, obtain vital signs, and answer questions you may have.  The only belongings needed at this time will be a list of your home medications and if applicable your C-PAP/BI-PAP machine.  A Pre-op nurse will start an IV and you may receive medication in preparation for surgery, including something to help you relax.     Please be aware that surgery does come with discomfort.  We want to make every effort to control your discomfort so please discuss any uncontrolled symptoms with your nurse.   Your doctor will most likely have prescribed pain medications.      If you are going home after surgery you will receive individualized written care instructions before being discharged.  A responsible adult must drive you to and from the hospital on the day of your surgery and ideally stay with you through the night.   .  Discharge prescriptions can be filled by the hospital pharmacy during regular pharmacy hours.  If you are having surgery late in the day/evening your prescription may be e-prescribed to your pharmacy.  Please verify your pharmacy hours or chose a 24 hour pharmacy to avoid not having access to your prescription because your pharmacy has closed for the day.    If you are staying overnight following surgery, you will be transported to your hospital room following the recovery period.  [facility] has all private rooms.    If you have any questions please call Pre-Admission Testing at [phone].  Deductibles and co-payments are collected on the day of service. Please be prepared to pay the required co-pay, deductible or deposit on the day of service as defined by your plan.    Call your surgeon immediately if you experience any of the following symptoms:  Sore Throat  Shortness of Breath or difficulty breathing  Cough  Chills  Body soreness or muscle pain  Headache  Fever  New loss of taste or smell  Do not arrive for your surgery ill.  Your procedure will need to be  rescheduled to another time.  You will need to call your physician before the day of surgery to avoid any unnecessary exposure to hospital staff as well as other patients.          CHLORHEXIDINE CLOTH INSTRUCTIONS  The morning of surgery follow these instructions using the Chlorhexidine cloths you've been given.  These steps reduce bacteria on the body.  Do not use the cloths near your eyes, ears mouth, genitalia or on open wounds.  Throw the cloths away after use but do not try to flush them down a toilet.      Open and remove one cloth at a time from the package.    Leave the cloth unfolded and begin the bathing.  Massage the skin with the cloths using gentle pressure to remove bacteria.  Do not scrub harshly.   Follow the steps below with one 2% CHG cloth per area (6 total cloths).  One cloth for neck, shoulders and chest.  One cloth for both arms, hands, fingers and underarms (do underarms last).  One cloth for the abdomen followed by groin.  One cloth for right leg and foot including between the toes.  One cloth for left leg and foot including between the toes.  The last cloth is to be used for the back of the neck, back and buttocks.    Allow the CHG to air dry 3 minutes on the skin which will give it time to work and decrease the chance of irritation.  The skin may feel sticky until it is dry.  Do not rinse with water or any other liquid or you will lose the beneficial effects of the CHG.  If mild skin irritation occurs, do rinse the skin to remove the CHG.  Report this to the nurse at time of admission.  Do not apply lotions, creams, ointments, deodorants or perfumes after using the clothes. Dress in clean clothes before coming to the hospital.

## 2025-08-08 ENCOUNTER — ANESTHESIA EVENT (OUTPATIENT)
Dept: PERIOP | Facility: HOSPITAL | Age: 40
End: 2025-08-08
Payer: COMMERCIAL

## 2025-08-08 ENCOUNTER — ANESTHESIA (OUTPATIENT)
Dept: PERIOP | Facility: HOSPITAL | Age: 40
End: 2025-08-08
Payer: COMMERCIAL

## 2025-08-08 ENCOUNTER — HOSPITAL ENCOUNTER (OUTPATIENT)
Facility: HOSPITAL | Age: 40
Setting detail: HOSPITAL OUTPATIENT SURGERY
Discharge: HOME | End: 2025-08-08
Attending: OBSTETRICS & GYNECOLOGY | Admitting: OBSTETRICS & GYNECOLOGY
Payer: COMMERCIAL

## 2025-08-08 VITALS
HEART RATE: 68 BPM | SYSTOLIC BLOOD PRESSURE: 129 MMHG | OXYGEN SATURATION: 99 % | DIASTOLIC BLOOD PRESSURE: 76 MMHG | RESPIRATION RATE: 16 BRPM | TEMPERATURE: 97.6 F

## 2025-08-08 DIAGNOSIS — Z30.09 STERILIZATION CONSULT: ICD-10-CM

## 2025-08-08 LAB
B-HCG UR QL: NEGATIVE
EXPIRATION DATE: NORMAL
INTERNAL NEGATIVE CONTROL: NEGATIVE
INTERNAL POSITIVE CONTROL: POSITIVE
Lab: NORMAL

## 2025-08-08 PROCEDURE — 25010000002 HYDROMORPHONE PER 4 MG: Performed by: NURSE ANESTHETIST, CERTIFIED REGISTERED

## 2025-08-08 PROCEDURE — 25010000002 LIDOCAINE 2% SOLUTION: Performed by: NURSE ANESTHETIST, CERTIFIED REGISTERED

## 2025-08-08 PROCEDURE — 58661 LAPAROSCOPY REMOVE ADNEXA: CPT | Performed by: OBSTETRICS & GYNECOLOGY

## 2025-08-08 PROCEDURE — 25010000002 SUGAMMADEX 200 MG/2ML SOLUTION: Performed by: NURSE ANESTHETIST, CERTIFIED REGISTERED

## 2025-08-08 PROCEDURE — 81025 URINE PREGNANCY TEST: CPT | Performed by: STUDENT IN AN ORGANIZED HEALTH CARE EDUCATION/TRAINING PROGRAM

## 2025-08-08 PROCEDURE — 25010000002 ONDANSETRON PER 1 MG: Performed by: NURSE ANESTHETIST, CERTIFIED REGISTERED

## 2025-08-08 PROCEDURE — 25010000002 FAMOTIDINE 10 MG/ML SOLUTION: Performed by: STUDENT IN AN ORGANIZED HEALTH CARE EDUCATION/TRAINING PROGRAM

## 2025-08-08 PROCEDURE — 25010000002 KETOROLAC TROMETHAMINE PER 15 MG: Performed by: NURSE ANESTHETIST, CERTIFIED REGISTERED

## 2025-08-08 PROCEDURE — 25010000002 FENTANYL CITRATE (PF) 50 MCG/ML SOLUTION: Performed by: NURSE ANESTHETIST, CERTIFIED REGISTERED

## 2025-08-08 PROCEDURE — 25810000003 LACTATED RINGERS PER 1000 ML: Performed by: STUDENT IN AN ORGANIZED HEALTH CARE EDUCATION/TRAINING PROGRAM

## 2025-08-08 PROCEDURE — 25010000002 PROPOFOL 200 MG/20ML EMULSION: Performed by: NURSE ANESTHETIST, CERTIFIED REGISTERED

## 2025-08-08 PROCEDURE — 88302 TISSUE EXAM BY PATHOLOGIST: CPT | Performed by: OBSTETRICS & GYNECOLOGY

## 2025-08-08 PROCEDURE — 25810000003 SODIUM CHLORIDE PER 500 ML: Performed by: OBSTETRICS & GYNECOLOGY

## 2025-08-08 PROCEDURE — 25010000002 BUPIVACAINE (PF) 0.5 % SOLUTION: Performed by: OBSTETRICS & GYNECOLOGY

## 2025-08-08 PROCEDURE — 25010000002 DEXAMETHASONE PER 1 MG: Performed by: NURSE ANESTHETIST, CERTIFIED REGISTERED

## 2025-08-08 RX ORDER — HYDROCODONE BITARTRATE AND ACETAMINOPHEN 5; 325 MG/1; MG/1
1 TABLET ORAL ONCE AS NEEDED
Status: DISCONTINUED | OUTPATIENT
Start: 2025-08-08 | End: 2025-08-08 | Stop reason: HOSPADM

## 2025-08-08 RX ORDER — SODIUM CHLORIDE 0.9 % (FLUSH) 0.9 %
10 SYRINGE (ML) INJECTION AS NEEDED
Status: DISCONTINUED | OUTPATIENT
Start: 2025-08-08 | End: 2025-08-08 | Stop reason: HOSPADM

## 2025-08-08 RX ORDER — SODIUM CHLORIDE 0.9 % (FLUSH) 0.9 %
3-10 SYRINGE (ML) INJECTION AS NEEDED
Status: DISCONTINUED | OUTPATIENT
Start: 2025-08-08 | End: 2025-08-08 | Stop reason: HOSPADM

## 2025-08-08 RX ORDER — FENTANYL CITRATE 50 UG/ML
INJECTION, SOLUTION INTRAMUSCULAR; INTRAVENOUS AS NEEDED
Status: DISCONTINUED | OUTPATIENT
Start: 2025-08-08 | End: 2025-08-08 | Stop reason: SURG

## 2025-08-08 RX ORDER — KETOROLAC TROMETHAMINE 30 MG/ML
INJECTION, SOLUTION INTRAMUSCULAR; INTRAVENOUS AS NEEDED
Status: DISCONTINUED | OUTPATIENT
Start: 2025-08-08 | End: 2025-08-08 | Stop reason: SURG

## 2025-08-08 RX ORDER — OXYCODONE HYDROCHLORIDE 5 MG/1
5 TABLET ORAL EVERY 8 HOURS PRN
Qty: 6 TABLET | Refills: 0 | Status: SHIPPED | OUTPATIENT
Start: 2025-08-08

## 2025-08-08 RX ORDER — FLUMAZENIL 0.1 MG/ML
0.2 INJECTION INTRAVENOUS AS NEEDED
Status: DISCONTINUED | OUTPATIENT
Start: 2025-08-08 | End: 2025-08-08 | Stop reason: HOSPADM

## 2025-08-08 RX ORDER — HYDROMORPHONE HYDROCHLORIDE 1 MG/ML
0.5 INJECTION, SOLUTION INTRAMUSCULAR; INTRAVENOUS; SUBCUTANEOUS
Status: DISCONTINUED | OUTPATIENT
Start: 2025-08-08 | End: 2025-08-08 | Stop reason: HOSPADM

## 2025-08-08 RX ORDER — ATROPINE SULFATE 0.4 MG/ML
0.4 INJECTION, SOLUTION INTRAMUSCULAR; INTRAVENOUS; SUBCUTANEOUS ONCE AS NEEDED
Status: DISCONTINUED | OUTPATIENT
Start: 2025-08-08 | End: 2025-08-08 | Stop reason: HOSPADM

## 2025-08-08 RX ORDER — EPHEDRINE SULFATE 50 MG/ML
5 INJECTION, SOLUTION INTRAVENOUS ONCE AS NEEDED
Status: DISCONTINUED | OUTPATIENT
Start: 2025-08-08 | End: 2025-08-08 | Stop reason: HOSPADM

## 2025-08-08 RX ORDER — ROCURONIUM BROMIDE 10 MG/ML
INJECTION, SOLUTION INTRAVENOUS AS NEEDED
Status: DISCONTINUED | OUTPATIENT
Start: 2025-08-08 | End: 2025-08-08 | Stop reason: SURG

## 2025-08-08 RX ORDER — FAMOTIDINE 10 MG/ML
20 INJECTION, SOLUTION INTRAVENOUS ONCE
Status: COMPLETED | OUTPATIENT
Start: 2025-08-08 | End: 2025-08-08

## 2025-08-08 RX ORDER — NALOXONE HCL 0.4 MG/ML
0.2 VIAL (ML) INJECTION AS NEEDED
Status: DISCONTINUED | OUTPATIENT
Start: 2025-08-08 | End: 2025-08-08 | Stop reason: HOSPADM

## 2025-08-08 RX ORDER — IPRATROPIUM BROMIDE AND ALBUTEROL SULFATE 2.5; .5 MG/3ML; MG/3ML
3 SOLUTION RESPIRATORY (INHALATION) ONCE AS NEEDED
Status: DISCONTINUED | OUTPATIENT
Start: 2025-08-08 | End: 2025-08-08 | Stop reason: HOSPADM

## 2025-08-08 RX ORDER — PROMETHAZINE HYDROCHLORIDE 25 MG/1
25 TABLET ORAL ONCE AS NEEDED
Status: DISCONTINUED | OUTPATIENT
Start: 2025-08-08 | End: 2025-08-08 | Stop reason: HOSPADM

## 2025-08-08 RX ORDER — DROPERIDOL 2.5 MG/ML
0.62 INJECTION, SOLUTION INTRAMUSCULAR; INTRAVENOUS
Status: DISCONTINUED | OUTPATIENT
Start: 2025-08-08 | End: 2025-08-08 | Stop reason: HOSPADM

## 2025-08-08 RX ORDER — LIDOCAINE HYDROCHLORIDE 20 MG/ML
INJECTION, SOLUTION INFILTRATION; PERINEURAL AS NEEDED
Status: DISCONTINUED | OUTPATIENT
Start: 2025-08-08 | End: 2025-08-08 | Stop reason: SURG

## 2025-08-08 RX ORDER — HYDRALAZINE HYDROCHLORIDE 20 MG/ML
5 INJECTION INTRAMUSCULAR; INTRAVENOUS
Status: DISCONTINUED | OUTPATIENT
Start: 2025-08-08 | End: 2025-08-08 | Stop reason: HOSPADM

## 2025-08-08 RX ORDER — ONDANSETRON 2 MG/ML
4 INJECTION INTRAMUSCULAR; INTRAVENOUS ONCE AS NEEDED
Status: DISCONTINUED | OUTPATIENT
Start: 2025-08-08 | End: 2025-08-08 | Stop reason: HOSPADM

## 2025-08-08 RX ORDER — OXYCODONE AND ACETAMINOPHEN 7.5; 325 MG/1; MG/1
1 TABLET ORAL EVERY 4 HOURS PRN
Status: DISCONTINUED | OUTPATIENT
Start: 2025-08-08 | End: 2025-08-08 | Stop reason: HOSPADM

## 2025-08-08 RX ORDER — IBUPROFEN 600 MG/1
600 TABLET, FILM COATED ORAL EVERY 6 HOURS PRN
Qty: 30 TABLET | Refills: 0 | Status: SHIPPED | OUTPATIENT
Start: 2025-08-08

## 2025-08-08 RX ORDER — LIDOCAINE HYDROCHLORIDE 10 MG/ML
0.5 INJECTION, SOLUTION INFILTRATION; PERINEURAL ONCE AS NEEDED
Status: DISCONTINUED | OUTPATIENT
Start: 2025-08-08 | End: 2025-08-08 | Stop reason: HOSPADM

## 2025-08-08 RX ORDER — LABETALOL HYDROCHLORIDE 5 MG/ML
5 INJECTION, SOLUTION INTRAVENOUS
Status: DISCONTINUED | OUTPATIENT
Start: 2025-08-08 | End: 2025-08-08 | Stop reason: HOSPADM

## 2025-08-08 RX ORDER — BUPIVACAINE HYDROCHLORIDE 5 MG/ML
INJECTION, SOLUTION EPIDURAL; INTRACAUDAL; PERINEURAL AS NEEDED
Status: DISCONTINUED | OUTPATIENT
Start: 2025-08-08 | End: 2025-08-08 | Stop reason: HOSPADM

## 2025-08-08 RX ORDER — FENTANYL CITRATE 50 UG/ML
50 INJECTION, SOLUTION INTRAMUSCULAR; INTRAVENOUS
Status: DISCONTINUED | OUTPATIENT
Start: 2025-08-08 | End: 2025-08-08 | Stop reason: HOSPADM

## 2025-08-08 RX ORDER — PROPOFOL 10 MG/ML
INJECTION, EMULSION INTRAVENOUS AS NEEDED
Status: DISCONTINUED | OUTPATIENT
Start: 2025-08-08 | End: 2025-08-08 | Stop reason: SURG

## 2025-08-08 RX ORDER — ONDANSETRON 2 MG/ML
INJECTION INTRAMUSCULAR; INTRAVENOUS AS NEEDED
Status: DISCONTINUED | OUTPATIENT
Start: 2025-08-08 | End: 2025-08-08 | Stop reason: SURG

## 2025-08-08 RX ORDER — SODIUM CHLORIDE 9 MG/ML
INJECTION, SOLUTION INTRAVENOUS AS NEEDED
Status: DISCONTINUED | OUTPATIENT
Start: 2025-08-08 | End: 2025-08-08 | Stop reason: HOSPADM

## 2025-08-08 RX ORDER — SODIUM CHLORIDE 9 MG/ML
40 INJECTION, SOLUTION INTRAVENOUS AS NEEDED
Status: DISCONTINUED | OUTPATIENT
Start: 2025-08-08 | End: 2025-08-08 | Stop reason: HOSPADM

## 2025-08-08 RX ORDER — SODIUM CHLORIDE 0.9 % (FLUSH) 0.9 %
3 SYRINGE (ML) INJECTION EVERY 12 HOURS SCHEDULED
Status: DISCONTINUED | OUTPATIENT
Start: 2025-08-08 | End: 2025-08-08 | Stop reason: HOSPADM

## 2025-08-08 RX ORDER — DEXAMETHASONE SODIUM PHOSPHATE 4 MG/ML
INJECTION, SOLUTION INTRA-ARTICULAR; INTRALESIONAL; INTRAMUSCULAR; INTRAVENOUS; SOFT TISSUE AS NEEDED
Status: DISCONTINUED | OUTPATIENT
Start: 2025-08-08 | End: 2025-08-08 | Stop reason: SURG

## 2025-08-08 RX ORDER — DIPHENHYDRAMINE HYDROCHLORIDE 50 MG/ML
12.5 INJECTION, SOLUTION INTRAMUSCULAR; INTRAVENOUS
Status: DISCONTINUED | OUTPATIENT
Start: 2025-08-08 | End: 2025-08-08 | Stop reason: HOSPADM

## 2025-08-08 RX ORDER — PROMETHAZINE HYDROCHLORIDE 25 MG/1
25 SUPPOSITORY RECTAL ONCE AS NEEDED
Status: DISCONTINUED | OUTPATIENT
Start: 2025-08-08 | End: 2025-08-08 | Stop reason: HOSPADM

## 2025-08-08 RX ORDER — MIDAZOLAM HYDROCHLORIDE 1 MG/ML
1 INJECTION, SOLUTION INTRAMUSCULAR; INTRAVENOUS
Status: DISCONTINUED | OUTPATIENT
Start: 2025-08-08 | End: 2025-08-08 | Stop reason: HOSPADM

## 2025-08-08 RX ORDER — SODIUM CHLORIDE, SODIUM LACTATE, POTASSIUM CHLORIDE, CALCIUM CHLORIDE 600; 310; 30; 20 MG/100ML; MG/100ML; MG/100ML; MG/100ML
9 INJECTION, SOLUTION INTRAVENOUS CONTINUOUS
Status: DISCONTINUED | OUTPATIENT
Start: 2025-08-08 | End: 2025-08-08 | Stop reason: HOSPADM

## 2025-08-08 RX ADMIN — SUGAMMADEX 200 MG: 100 INJECTION, SOLUTION INTRAVENOUS at 08:19

## 2025-08-08 RX ADMIN — FENTANYL CITRATE 50 MCG: 50 INJECTION, SOLUTION INTRAMUSCULAR; INTRAVENOUS at 08:48

## 2025-08-08 RX ADMIN — KETOROLAC TROMETHAMINE 30 MG: 30 INJECTION, SOLUTION INTRAMUSCULAR; INTRAVENOUS at 07:54

## 2025-08-08 RX ADMIN — SODIUM CHLORIDE, POTASSIUM CHLORIDE, SODIUM LACTATE AND CALCIUM CHLORIDE 9 ML/HR: 600; 310; 30; 20 INJECTION, SOLUTION INTRAVENOUS at 07:15

## 2025-08-08 RX ADMIN — FENTANYL CITRATE 50 MCG: 50 INJECTION, SOLUTION INTRAMUSCULAR; INTRAVENOUS at 07:29

## 2025-08-08 RX ADMIN — ROCURONIUM BROMIDE 50 MG: 10 INJECTION, SOLUTION INTRAVENOUS at 07:32

## 2025-08-08 RX ADMIN — PROPOFOL 150 MG: 10 INJECTION, EMULSION INTRAVENOUS at 07:32

## 2025-08-08 RX ADMIN — LIDOCAINE HYDROCHLORIDE 100 MG: 20 INJECTION, SOLUTION INFILTRATION; PERINEURAL at 07:30

## 2025-08-08 RX ADMIN — OXYCODONE AND ACETAMINOPHEN 1 TABLET: 7.5; 325 TABLET ORAL at 09:42

## 2025-08-08 RX ADMIN — ONDANSETRON 4 MG: 2 INJECTION, SOLUTION INTRAMUSCULAR; INTRAVENOUS at 07:54

## 2025-08-08 RX ADMIN — HYDROMORPHONE HYDROCHLORIDE 0.5 MG: 1 INJECTION, SOLUTION INTRAMUSCULAR; INTRAVENOUS; SUBCUTANEOUS at 08:49

## 2025-08-08 RX ADMIN — DEXAMETHASONE SODIUM PHOSPHATE 8 MG: 4 INJECTION, SOLUTION INTRA-ARTICULAR; INTRALESIONAL; INTRAMUSCULAR; INTRAVENOUS; SOFT TISSUE at 07:36

## 2025-08-08 RX ADMIN — HYDROMORPHONE HYDROCHLORIDE 0.5 MG: 1 INJECTION, SOLUTION INTRAMUSCULAR; INTRAVENOUS; SUBCUTANEOUS at 09:28

## 2025-08-08 RX ADMIN — FAMOTIDINE 20 MG: 10 INJECTION INTRAVENOUS at 07:15

## 2025-08-11 LAB
CYTO UR: NORMAL
LAB AP CASE REPORT: NORMAL
PATH REPORT.FINAL DX SPEC: NORMAL
PATH REPORT.GROSS SPEC: NORMAL

## 2025-08-19 ENCOUNTER — OFFICE VISIT (OUTPATIENT)
Dept: OBSTETRICS AND GYNECOLOGY | Facility: CLINIC | Age: 40
End: 2025-08-19
Payer: COMMERCIAL

## 2025-08-19 VITALS — SYSTOLIC BLOOD PRESSURE: 125 MMHG | DIASTOLIC BLOOD PRESSURE: 89 MMHG | BODY MASS INDEX: 29.63 KG/M2 | WEIGHT: 162 LBS

## 2025-08-19 DIAGNOSIS — R10.2 PELVIC PAIN: ICD-10-CM

## 2025-08-19 DIAGNOSIS — R74.8 ELEVATED ALKALINE PHOSPHATASE LEVEL: ICD-10-CM

## 2025-08-19 DIAGNOSIS — Z09 POSTOP CHECK: Primary | ICD-10-CM

## 2025-08-19 DIAGNOSIS — D70.9 NEUTROPENIA, UNSPECIFIED TYPE: ICD-10-CM

## 2025-08-19 LAB
BILIRUB BLD-MCNC: NEGATIVE MG/DL
GLUCOSE UR STRIP-MCNC: NEGATIVE MG/DL
KETONES UR QL: NEGATIVE
LEUKOCYTE EST, POC: NEGATIVE
NITRITE UR-MCNC: NEGATIVE MG/ML
PH UR: 6 [PH] (ref 5–8)
PROT UR STRIP-MCNC: NEGATIVE MG/DL
RBC # UR STRIP: NEGATIVE /UL
SP GR UR: 1.02 (ref 1–1.03)
UROBILINOGEN UR QL: NORMAL

## 2025-08-19 RX ORDER — IRON/MFOLATE/C/E/B12/BIOT/COPP 150-1-500
TABLET ORAL
COMMUNITY
End: 2025-08-20 | Stop reason: SDUPTHER

## 2025-08-19 RX ORDER — ESTRADIOL 0.1 MG/G
CREAM VAGINAL
COMMUNITY
Start: 2025-07-19

## 2025-08-20 ENCOUNTER — RESULTS FOLLOW-UP (OUTPATIENT)
Dept: OBSTETRICS AND GYNECOLOGY | Facility: CLINIC | Age: 40
End: 2025-08-20
Payer: COMMERCIAL

## 2025-08-20 DIAGNOSIS — R10.2 VAGINAL PAIN: ICD-10-CM

## 2025-08-20 LAB
ALBUMIN SERPL-MCNC: 4.5 G/DL (ref 3.5–5.2)
ALBUMIN/GLOB SERPL: 1.7 G/DL
ALP SERPL-CCNC: 165 U/L (ref 39–117)
ALT SERPL-CCNC: 15 U/L (ref 1–33)
AST SERPL-CCNC: 17 U/L (ref 1–32)
BASOPHILS # BLD AUTO: 0.05 10*3/MM3 (ref 0–0.2)
BASOPHILS NFR BLD AUTO: 1.1 % (ref 0–1.5)
BILIRUB SERPL-MCNC: 0.2 MG/DL (ref 0–1.2)
BUN SERPL-MCNC: 10 MG/DL (ref 6–20)
BUN/CREAT SERPL: 14.7 (ref 7–25)
CALCIUM SERPL-MCNC: 9.3 MG/DL (ref 8.6–10.5)
CHLORIDE SERPL-SCNC: 104 MMOL/L (ref 98–107)
CO2 SERPL-SCNC: 24.9 MMOL/L (ref 22–29)
CREAT SERPL-MCNC: 0.68 MG/DL (ref 0.57–1)
EGFRCR SERPLBLD CKD-EPI 2021: 113.8 ML/MIN/1.73
EOSINOPHIL # BLD AUTO: 0.1 10*3/MM3 (ref 0–0.4)
EOSINOPHIL NFR BLD AUTO: 2.3 % (ref 0.3–6.2)
ERYTHROCYTE [DISTWIDTH] IN BLOOD BY AUTOMATED COUNT: 18.3 % (ref 12.3–15.4)
GLOBULIN SER CALC-MCNC: 2.7 GM/DL
GLUCOSE SERPL-MCNC: 81 MG/DL (ref 65–99)
HCT VFR BLD AUTO: 38.3 % (ref 34–46.6)
HGB BLD-MCNC: 11.8 G/DL (ref 12–15.9)
IMM GRANULOCYTES # BLD AUTO: 0.01 10*3/MM3 (ref 0–0.05)
IMM GRANULOCYTES NFR BLD AUTO: 0.2 % (ref 0–0.5)
LYMPHOCYTES # BLD AUTO: 1.94 10*3/MM3 (ref 0.7–3.1)
LYMPHOCYTES NFR BLD AUTO: 43.7 % (ref 19.6–45.3)
MCH RBC QN AUTO: 25.1 PG (ref 26.6–33)
MCHC RBC AUTO-ENTMCNC: 30.8 G/DL (ref 31.5–35.7)
MCV RBC AUTO: 81.5 FL (ref 79–97)
MONOCYTES # BLD AUTO: 0.39 10*3/MM3 (ref 0.1–0.9)
MONOCYTES NFR BLD AUTO: 8.8 % (ref 5–12)
NEUTROPHILS # BLD AUTO: 1.95 10*3/MM3 (ref 1.7–7)
NEUTROPHILS NFR BLD AUTO: 43.9 % (ref 42.7–76)
NRBC BLD AUTO-RTO: 0 /100 WBC (ref 0–0.2)
PLATELET # BLD AUTO: 249 10*3/MM3 (ref 140–450)
POTASSIUM SERPL-SCNC: 4.1 MMOL/L (ref 3.5–5.2)
PROT SERPL-MCNC: 7.2 G/DL (ref 6–8.5)
RBC # BLD AUTO: 4.7 10*6/MM3 (ref 3.77–5.28)
SODIUM SERPL-SCNC: 139 MMOL/L (ref 136–145)
WBC # BLD AUTO: 4.44 10*3/MM3 (ref 3.4–10.8)

## 2025-08-21 RX ORDER — ESTRADIOL 0.1 MG/G
2 CREAM VAGINAL DAILY
Qty: 42.5 G | Refills: 0 | OUTPATIENT
Start: 2025-08-21 | End: 2025-08-31

## 2025-08-21 RX ORDER — CHOLECALCIFEROL (VITAMIN D3) 25 MCG
1 TABLET,CHEWABLE ORAL DAILY
Qty: 30 CAPSULE | Refills: 1 | Status: SHIPPED | OUTPATIENT
Start: 2025-08-21

## (undated) DEVICE — SOL NACL 0.9PCT 1000ML

## (undated) DEVICE — SUT VIC 0 UR6 27IN VCP603H

## (undated) DEVICE — LOU GYN LAPAROSCOPY: Brand: MEDLINE INDUSTRIES, INC.

## (undated) DEVICE — GLV SURG BIOGEL LTX PF 6

## (undated) DEVICE — ENDOPATH XCEL DILATING TIP TROCARS WITH STABILITY SLEEVES: Brand: ENDOPATH XCEL

## (undated) DEVICE — ENDOPATH XCEL BLUNT TIP TROCARS WITH SMOOTH SLEEVES: Brand: ENDOPATH XCEL

## (undated) DEVICE — SYR LL TP 10ML STRL

## (undated) DEVICE — INSUFFLATION TUBING SET, WITH GAS FILTER: Brand: N.A.

## (undated) DEVICE — EXOFIN PRECISION PEN HIGH VISCOSITY TOPICAL SKIN ADHESIVE: Brand: EXOFIN PRECISION PEN, 1G

## (undated) DEVICE — HARMONIC 700 SHEARS, ADVANCED HEMOSTASIS: Brand: HARMONIC

## (undated) DEVICE — GLV SURG SENSICARE POLYISPRN W/ALOE PF LF 6.5 GRN STRL

## (undated) DEVICE — LAPAROSCOPIC SMOKE FILTRATION SYSTEM: Brand: PALL LAPAROSHIELD® PLUS LAPAROSCOPIC SMOKE FILTRATION SYSTEM

## (undated) DEVICE — ANTIBACTERIAL UNDYED BRAIDED (POLYGLACTIN 910), SYNTHETIC ABSORBABLE SUTURE: Brand: COATED VICRYL